# Patient Record
Sex: MALE | Race: BLACK OR AFRICAN AMERICAN | NOT HISPANIC OR LATINO | URBAN - METROPOLITAN AREA
[De-identification: names, ages, dates, MRNs, and addresses within clinical notes are randomized per-mention and may not be internally consistent; named-entity substitution may affect disease eponyms.]

---

## 2019-07-23 ENCOUNTER — INPATIENT (INPATIENT)
Facility: HOSPITAL | Age: 39
LOS: 2 days | Discharge: HOME | DRG: 603 | End: 2019-07-26
Attending: INTERNAL MEDICINE | Admitting: INTERNAL MEDICINE
Payer: COMMERCIAL

## 2019-07-23 VITALS
WEIGHT: 210.1 LBS | HEART RATE: 92 BPM | HEIGHT: 69 IN | DIASTOLIC BLOOD PRESSURE: 82 MMHG | OXYGEN SATURATION: 100 % | RESPIRATION RATE: 16 BRPM | SYSTOLIC BLOOD PRESSURE: 138 MMHG | TEMPERATURE: 99 F

## 2019-07-23 DIAGNOSIS — L03.119 CELLULITIS OF UNSPECIFIED PART OF LIMB: ICD-10-CM

## 2019-07-23 DIAGNOSIS — D64.9 ANEMIA, UNSPECIFIED: ICD-10-CM

## 2019-07-23 DIAGNOSIS — Z29.9 ENCOUNTER FOR PROPHYLACTIC MEASURES, UNSPECIFIED: ICD-10-CM

## 2019-07-23 DIAGNOSIS — L03.115 CELLULITIS OF RIGHT LOWER LIMB: ICD-10-CM

## 2019-07-23 DIAGNOSIS — I10 ESSENTIAL (PRIMARY) HYPERTENSION: ICD-10-CM

## 2019-07-23 LAB
ANION GAP SERPL CALC-SCNC: 4 MMOL/L — LOW (ref 5–17)
BUN SERPL-MCNC: 13 MG/DL — SIGNIFICANT CHANGE UP (ref 7–18)
CALCIUM SERPL-MCNC: 8.6 MG/DL — SIGNIFICANT CHANGE UP (ref 8.4–10.5)
CHLORIDE SERPL-SCNC: 106 MMOL/L — SIGNIFICANT CHANGE UP (ref 96–108)
CO2 SERPL-SCNC: 28 MMOL/L — SIGNIFICANT CHANGE UP (ref 22–31)
CREAT SERPL-MCNC: 1.26 MG/DL — SIGNIFICANT CHANGE UP (ref 0.5–1.3)
GLUCOSE SERPL-MCNC: 92 MG/DL — SIGNIFICANT CHANGE UP (ref 70–99)
HCT VFR BLD CALC: 38.2 % — LOW (ref 39–50)
HGB BLD-MCNC: 12.2 G/DL — LOW (ref 13–17)
MCHC RBC-ENTMCNC: 24.6 PG — LOW (ref 27–34)
MCHC RBC-ENTMCNC: 31.9 GM/DL — LOW (ref 32–36)
MCV RBC AUTO: 77 FL — LOW (ref 80–100)
NRBC # BLD: 0 /100 WBCS — SIGNIFICANT CHANGE UP (ref 0–0)
PLATELET # BLD AUTO: 294 K/UL — SIGNIFICANT CHANGE UP (ref 150–400)
POTASSIUM SERPL-MCNC: 4.2 MMOL/L — SIGNIFICANT CHANGE UP (ref 3.5–5.3)
POTASSIUM SERPL-SCNC: 4.2 MMOL/L — SIGNIFICANT CHANGE UP (ref 3.5–5.3)
RBC # BLD: 4.96 M/UL — SIGNIFICANT CHANGE UP (ref 4.2–5.8)
RBC # FLD: 15.7 % — HIGH (ref 10.3–14.5)
SODIUM SERPL-SCNC: 138 MMOL/L — SIGNIFICANT CHANGE UP (ref 135–145)
WBC # BLD: 7.97 K/UL — SIGNIFICANT CHANGE UP (ref 3.8–10.5)
WBC # FLD AUTO: 7.97 K/UL — SIGNIFICANT CHANGE UP (ref 3.8–10.5)

## 2019-07-23 PROCEDURE — 99223 1ST HOSP IP/OBS HIGH 75: CPT

## 2019-07-23 PROCEDURE — 73620 X-RAY EXAM OF FOOT: CPT | Mod: 26,RT

## 2019-07-23 PROCEDURE — 99285 EMERGENCY DEPT VISIT HI MDM: CPT

## 2019-07-23 RX ORDER — ENOXAPARIN SODIUM 100 MG/ML
40 INJECTION SUBCUTANEOUS DAILY
Refills: 0 | Status: DISCONTINUED | OUTPATIENT
Start: 2019-07-23 | End: 2019-07-26

## 2019-07-23 RX ORDER — NYSTATIN CREAM 100000 [USP'U]/G
1 CREAM TOPICAL EVERY 12 HOURS
Refills: 0 | Status: DISCONTINUED | OUTPATIENT
Start: 2019-07-23 | End: 2019-07-24

## 2019-07-23 RX ORDER — LISINOPRIL 2.5 MG/1
2.5 TABLET ORAL DAILY
Refills: 0 | Status: DISCONTINUED | OUTPATIENT
Start: 2019-07-23 | End: 2019-07-24

## 2019-07-23 RX ORDER — ACETAMINOPHEN 500 MG
650 TABLET ORAL EVERY 6 HOURS
Refills: 0 | Status: DISCONTINUED | OUTPATIENT
Start: 2019-07-23 | End: 2019-07-26

## 2019-07-23 RX ORDER — AMPICILLIN SODIUM AND SULBACTAM SODIUM 250; 125 MG/ML; MG/ML
3 INJECTION, POWDER, FOR SUSPENSION INTRAMUSCULAR; INTRAVENOUS ONCE
Refills: 0 | Status: COMPLETED | OUTPATIENT
Start: 2019-07-23 | End: 2019-07-23

## 2019-07-23 RX ORDER — VANCOMYCIN HCL 1 G
1000 VIAL (EA) INTRAVENOUS ONCE
Refills: 0 | Status: COMPLETED | OUTPATIENT
Start: 2019-07-23 | End: 2019-07-23

## 2019-07-23 RX ADMIN — AMPICILLIN SODIUM AND SULBACTAM SODIUM 3 GRAM(S): 250; 125 INJECTION, POWDER, FOR SUSPENSION INTRAMUSCULAR; INTRAVENOUS at 22:40

## 2019-07-23 RX ADMIN — AMPICILLIN SODIUM AND SULBACTAM SODIUM 200 GRAM(S): 250; 125 INJECTION, POWDER, FOR SUSPENSION INTRAMUSCULAR; INTRAVENOUS at 22:15

## 2019-07-23 RX ADMIN — Medication 250 MILLIGRAM(S): at 22:40

## 2019-07-23 NOTE — H&P ADULT - NSHPPHYSICALEXAM_GEN_ALL_CORE
Vital Signs Last 24 Hrs  T(C): 37.6 (23 Jul 2019 22:38), Max: 37.6 (23 Jul 2019 22:38)  T(F): 99.6 (23 Jul 2019 22:38), Max: 99.6 (23 Jul 2019 22:38)  HR: 64 (23 Jul 2019 22:38) (64 - 92)  BP: 133/73 (23 Jul 2019 22:38) (133/73 - 138/82)  BP(mean): --  RR: 18 (23 Jul 2019 22:38) (16 - 18)  SpO2: 100% (23 Jul 2019 22:38) (100% - 100%)

## 2019-07-23 NOTE — H&P ADULT - PROBLEM SELECTOR PLAN 1
p/w serous drainage from right foot and pain w/ symptoms on left foot as well  s/p 10 days of Augmentin outpatient with no improvement  s/p vanc and unasyn x 1 in ED  c/w IV antibiotics  f/u xray of right foot  f/u blood cultures x2  f/u ESR and CRP  f/u HbA1C  podiatry consulted  ID consult p/w serous drainage from right foot and pain w/ symptoms on left foot as well  s/p 10 days of Augmentin outpatient with no improvement  s/p vanc and unasyn x 1 in ED  c/w vancomycin 1500mg q12  f/u xray of right foot  f/u blood cultures x2  f/u ESR and CRP  f/u HbA1C  podiatry consulted  ID consulted - Dr. Nunez p/w serous and foul-smelling drainage from right foot and pain w/ symptoms on left foot as well  s/p 10 days of Augmentin outpatient with no improvement  s/p vanc and unasyn x 1 in ED  c/w vancomycin 1500mg q12  f/u xray of right foot  f/u blood cultures x2  f/u ESR and CRP  f/u HbA1C  podiatry consulted  ID consulted - Dr. Nunez

## 2019-07-23 NOTE — H&P ADULT - EXTREMITIES COMMENTS
marked serous drainage from right foot with foul odor; frothy layer over plantar surface of left foot noted;  no obvious ulcerations noted, but evidence of skin breakdown of right foot plantar surface noted

## 2019-07-23 NOTE — H&P ADULT - ASSESSMENT
40 y/o male admitted to medicine for b/l foot cellulitis, worse on the right, refractory to outpatient therapy.

## 2019-07-23 NOTE — H&P ADULT - ATTENDING COMMENTS
This is a 39 year old man with no known medical history presenting to the ED on account of failed outpatient antimicrobial therapy for suspected right foot soft tissue infection. He states he had tinea infection in both feet which became worse on the right with skin breakdown. He saw a podiatrist who treated him with topical antifungal cream and PO Augmentin for a week. It appeared the suspected foot infection became worse after he returned to the podiatrist and he was sent in for further management.  He denies any fevers or any constitutional symptoms.    Vital Signs Last 24 Hrs  T(C): 37.6 (23 Jul 2019 22:38), Max: 37.6 (23 Jul 2019 22:38)  T(F): 99.6 (23 Jul 2019 22:38), Max: 99.6 (23 Jul 2019 22:38)  HR: 64 (23 Jul 2019 22:38) (64 - 92)  BP: 133/73 (23 Jul 2019 22:38) (133/73 - 138/82)  RR: 18 (23 Jul 2019 22:38) (16 - 18)  SpO2: 100% (23 Jul 2019 22:38) (100% - 100%)    Elderly man, NAD AAO X 3  CTA B/L; RRR S1S2 only  Soft NT ND BS +  Right foot - extensive maceration of the forefoot and toes with swelling and peeling /denudationof the plantar skin with copious foul smelling clear / ?serous drainage. The skin beyond the toe appear excoriated. There is no warmth, tenderness or erythema.  Left foot appears less involved with some maceration in the forefoot/toes   No focal deficits                          12.2   7.97  )-----------( 294      ( 23 Jul 2019 21:54 )             38.2     07-23    138  |  106  |  13  ----------------------------<  92  4.2   |  28  |  1.26    Ca    8.6      23 Jul 2019 21:54    x ra y foot- appears unremarkable - independent review.    Impression  39 year old with no medical hx with B/L feet infection R>>>L with failed outpatient therapy.    Plan  Admit to Medicine  Empiric antibiotics with MRSA coverage This is a 39 year old man with no known medical history presenting to the ED on account of failed outpatient antimicrobial therapy for suspected right foot soft tissue infection. He states he had tinea infection in both feet which became worse on the right with skin breakdown. He saw a podiatrist who treated him with topical antifungal cream and PO Augmentin for a week. It appeared the suspected foot infection became worse after he returned to the podiatrist and he was sent in for further management.  He denies any fevers or any constitutional symptoms.    Vital Signs Last 24 Hrs  T(C): 37.6 (23 Jul 2019 22:38), Max: 37.6 (23 Jul 2019 22:38)  T(F): 99.6 (23 Jul 2019 22:38), Max: 99.6 (23 Jul 2019 22:38)  HR: 64 (23 Jul 2019 22:38) (64 - 92)  BP: 133/73 (23 Jul 2019 22:38) (133/73 - 138/82)  RR: 18 (23 Jul 2019 22:38) (16 - 18)  SpO2: 100% (23 Jul 2019 22:38) (100% - 100%)    Elderly man, NAD AAO X 3  CTA B/L; RRR S1S2 only  Soft NT ND BS +  Right foot - extensive maceration of the forefoot and toes with swelling and peeling /denudationof the plantar skin with copious foul smelling clear / ?serous drainage. The skin beyond the toe appear excoriated. There is no warmth, tenderness or erythema.  Left foot appears less involved with some maceration in the forefoot/toes   No focal deficits                          12.2   7.97  )-----------( 294      ( 23 Jul 2019 21:54 )             38.2     07-23    138  |  106  |  13  ----------------------------<  92  4.2   |  28  |  1.26    Ca    8.6      23 Jul 2019 21:54    x ra y foot- appears unremarkable - independent review.    Impression  39 year old with no medical hx with B/L feet infection R>>>L with failed outpatient therapy. No hx of DM2 or CRF but with borderline anemia level    Plan  Admit to Medicine  Empiric antibiotics with MRSA coverage  Will continue with empiric antibiotics with MRSA coverage  IV Doxycycline vs IV vancomycin   Wound care consult  F/up pending cultures  F/up anemia work up.  Counseling on proper foot care to prevent tinea in the future occurrence.

## 2019-07-23 NOTE — H&P ADULT - PROBLEM SELECTOR PLAN 2
pt denies any prior hx of HTN  bp 138/82  starting lisinopril 5mg qd  monitor vitals  DASH diet pt denies any prior hx of HTN  bp 138/82 mmHg in ED  starting lisinopril 2.5mg qd  monitor vitals  DASH diet

## 2019-07-23 NOTE — H&P ADULT - PROBLEM SELECTOR PLAN 4
IMPROVE VTE Individual Risk Assessment    RISK                                                                Points    [  ] Previous VTE                                                  3  [  ] Thrombophilia                                               2  [  ] Lower limb paralysis                                      2        (unable to hold up >15 seconds)    [  ] Current Cancer                                              2         (within 6 months)  [X  ] Immobilization > 24 hrs                                1  [  ] ICU/CCU stay > 24 hours                              1  [  ] Age > 60                                                      1    IMPROVE VTE Score ____1_____    Lovenox 40mg subq for dvt ppx

## 2019-07-23 NOTE — ED PROVIDER NOTE - OBJECTIVE STATEMENT
40 y/o M patient with no significant PMHx and no significant PSHx presents from podiatrist to the ED with an infected wound to the right foot. Patient says he has been experiencing severe Athlete's foot. Patient says he noticed a laceration to the right foot which became infected. Patient says he returned to his podiatrist who informed patient that his laceration became infected and he also has a bacterial infection to the site. Patient says he was Rx a x10 day course of Augmentin which he finished x2 day ago. Patient endorses no relief with the Augmentin and says he has foul smelling drainage and swelling to the right foot. Patient says he presented back to his podiatrist who advised him to come to the ED for IV antibiotics. Patient describes the areas as red and itchy. Patient notes changing his wound dressings x2-x3 times a day and using antifungal cream. Patient denies fever, chills, and any other complaints. NKDA.

## 2019-07-23 NOTE — H&P ADULT - NSHPSOCIALHISTORY_GEN_ALL_CORE
Pt reports smoking 2 cigars daily for 10 years. Pt reports smoking 2 cigars daily for 10 years. No alcohol or illicit drug use.

## 2019-07-23 NOTE — H&P ADULT - NEGATIVE NEUROLOGICAL SYMPTOMS
no paresthesias/no focal seizures/no syncope/no weakness/no headache/no difficulty walking/no transient paralysis

## 2019-07-23 NOTE — ED ADULT NURSE NOTE - OBJECTIVE STATEMENT
pt is for wounds.  pt stated that received a phone call from podiatry to go ER d/t infection b/l foot, c/o pain, yellow drainage from right foot, denied fever or chills, pt calm at this time.

## 2019-07-23 NOTE — H&P ADULT - HISTORY OF PRESENT ILLNESS
38 y/o male with no PMHx presents to the ED with chief complaint of right foot pain with serous discharge x 11 days. Patient reports that he was given Augmentin and an antifungal cream by his podiatrist in [place] and reports that he completed the full 10 days of treatment without improvement. His podiatrist instructed him to go to the ED if he didn't notice improvement, which is why he comes in today. He denies fever at home. He reports that the discharge from his foot is mostly "watery" but has noticed yellow discoloration as well. He also reports that he has started developing similar symptoms in his left foot. He denies any cough, sob, dysuria, nausea, vomiting or any other symptoms at this time. He reports last traveling in May when he went to [place]. He says his fungal foot infection, which he was told was athlete's foot, developed from being barefoot in the locker room at his gym.    In the ED, patient received vancomycin x1 and unasyn x1.

## 2019-07-23 NOTE — ED PROVIDER NOTE - CLINICAL SUMMARY MEDICAL DECISION MAKING FREE TEXT BOX
Patient with infected wound to the right foot. Patient failed out patient  treatment. Obtain labs, foot X-ray and admit for IV antibiotics.

## 2019-07-23 NOTE — ED ADULT NURSE NOTE - NSIMPLEMENTINTERV_GEN_ALL_ED
Implemented All Universal Safety Interventions:  [place] to call system. Call bell, personal items and telephone within reach. Instruct patient to call for assistance. Room bathroom lighting operational. Non-slip footwear when patient is off stretcher. Physically safe environment: no spills, clutter or unnecessary equipment. Stretcher in lowest position, wheels locked, appropriate side rails in place.

## 2019-07-24 LAB
24R-OH-CALCIDIOL SERPL-MCNC: 18 NG/ML — LOW (ref 30–80)
ALBUMIN SERPL ELPH-MCNC: 3.1 G/DL — LOW (ref 3.5–5)
ALP SERPL-CCNC: 72 U/L — SIGNIFICANT CHANGE UP (ref 40–120)
ALT FLD-CCNC: 16 U/L DA — SIGNIFICANT CHANGE UP (ref 10–60)
ANION GAP SERPL CALC-SCNC: 5 MMOL/L — SIGNIFICANT CHANGE UP (ref 5–17)
AST SERPL-CCNC: 12 U/L — SIGNIFICANT CHANGE UP (ref 10–40)
BASOPHILS # BLD AUTO: 0.03 K/UL — SIGNIFICANT CHANGE UP (ref 0–0.2)
BASOPHILS NFR BLD AUTO: 0.5 % — SIGNIFICANT CHANGE UP (ref 0–2)
BILIRUB SERPL-MCNC: 0.4 MG/DL — SIGNIFICANT CHANGE UP (ref 0.2–1.2)
BUN SERPL-MCNC: 11 MG/DL — SIGNIFICANT CHANGE UP (ref 7–18)
CALCIUM SERPL-MCNC: 8.2 MG/DL — LOW (ref 8.4–10.5)
CHLORIDE SERPL-SCNC: 106 MMOL/L — SIGNIFICANT CHANGE UP (ref 96–108)
CHOLEST SERPL-MCNC: 140 MG/DL — SIGNIFICANT CHANGE UP (ref 10–199)
CO2 SERPL-SCNC: 27 MMOL/L — SIGNIFICANT CHANGE UP (ref 22–31)
CREAT SERPL-MCNC: 1.25 MG/DL — SIGNIFICANT CHANGE UP (ref 0.5–1.3)
CRP SERPL-MCNC: 0.87 MG/DL — HIGH (ref 0–0.4)
EOSINOPHIL # BLD AUTO: 0.74 K/UL — HIGH (ref 0–0.5)
EOSINOPHIL NFR BLD AUTO: 12.4 % — HIGH (ref 0–6)
ERYTHROCYTE [SEDIMENTATION RATE] IN BLOOD: 19 MM/HR — HIGH (ref 0–15)
FERRITIN SERPL-MCNC: 236 NG/ML — SIGNIFICANT CHANGE UP (ref 30–400)
FOLATE SERPL-MCNC: 5.2 NG/ML — SIGNIFICANT CHANGE UP
GLUCOSE SERPL-MCNC: 95 MG/DL — SIGNIFICANT CHANGE UP (ref 70–99)
HBA1C BLD-MCNC: 5.8 % — HIGH (ref 4–5.6)
HCT VFR BLD CALC: 37.4 % — LOW (ref 39–50)
HDLC SERPL-MCNC: 48 MG/DL — SIGNIFICANT CHANGE UP
HGB BLD-MCNC: 12 G/DL — LOW (ref 13–17)
IMM GRANULOCYTES NFR BLD AUTO: 0.2 % — SIGNIFICANT CHANGE UP (ref 0–1.5)
IRON SATN MFR SERPL: 33 % — SIGNIFICANT CHANGE UP (ref 20–55)
IRON SATN MFR SERPL: 60 UG/DL — LOW (ref 65–170)
LIPID PNL WITH DIRECT LDL SERPL: 71 MG/DL — SIGNIFICANT CHANGE UP
LYMPHOCYTES # BLD AUTO: 1.75 K/UL — SIGNIFICANT CHANGE UP (ref 1–3.3)
LYMPHOCYTES # BLD AUTO: 29.2 % — SIGNIFICANT CHANGE UP (ref 13–44)
MAGNESIUM SERPL-MCNC: 2 MG/DL — SIGNIFICANT CHANGE UP (ref 1.6–2.6)
MCHC RBC-ENTMCNC: 24.4 PG — LOW (ref 27–34)
MCHC RBC-ENTMCNC: 32.1 GM/DL — SIGNIFICANT CHANGE UP (ref 32–36)
MCV RBC AUTO: 76 FL — LOW (ref 80–100)
MONOCYTES # BLD AUTO: 0.54 K/UL — SIGNIFICANT CHANGE UP (ref 0–0.9)
MONOCYTES NFR BLD AUTO: 9 % — SIGNIFICANT CHANGE UP (ref 2–14)
NEUTROPHILS # BLD AUTO: 2.92 K/UL — SIGNIFICANT CHANGE UP (ref 1.8–7.4)
NEUTROPHILS NFR BLD AUTO: 48.7 % — SIGNIFICANT CHANGE UP (ref 43–77)
NRBC # BLD: 0 /100 WBCS — SIGNIFICANT CHANGE UP (ref 0–0)
PHOSPHATE SERPL-MCNC: 2.9 MG/DL — SIGNIFICANT CHANGE UP (ref 2.5–4.5)
PLATELET # BLD AUTO: 290 K/UL — SIGNIFICANT CHANGE UP (ref 150–400)
POTASSIUM SERPL-MCNC: 3.5 MMOL/L — SIGNIFICANT CHANGE UP (ref 3.5–5.3)
POTASSIUM SERPL-SCNC: 3.5 MMOL/L — SIGNIFICANT CHANGE UP (ref 3.5–5.3)
PROT SERPL-MCNC: 6.7 G/DL — SIGNIFICANT CHANGE UP (ref 6–8.3)
RBC # BLD: 4.92 M/UL — SIGNIFICANT CHANGE UP (ref 4.2–5.8)
RBC # FLD: 15.6 % — HIGH (ref 10.3–14.5)
SODIUM SERPL-SCNC: 138 MMOL/L — SIGNIFICANT CHANGE UP (ref 135–145)
TIBC SERPL-MCNC: 184 UG/DL — LOW (ref 250–450)
TOTAL CHOLESTEROL/HDL RATIO MEASUREMENT: 2.9 RATIO — LOW (ref 3.4–9.6)
TRANSFERRIN SERPL-MCNC: 149 MG/DL — LOW (ref 200–360)
TRIGL SERPL-MCNC: 107 MG/DL — SIGNIFICANT CHANGE UP (ref 10–149)
TSH SERPL-MCNC: 1.22 UU/ML — SIGNIFICANT CHANGE UP (ref 0.34–4.82)
UIBC SERPL-MCNC: 124 UG/DL — SIGNIFICANT CHANGE UP (ref 110–370)
VIT B12 SERPL-MCNC: 514 PG/ML — SIGNIFICANT CHANGE UP (ref 232–1245)
WBC # BLD: 5.99 K/UL — SIGNIFICANT CHANGE UP (ref 3.8–10.5)
WBC # FLD AUTO: 5.99 K/UL — SIGNIFICANT CHANGE UP (ref 3.8–10.5)

## 2019-07-24 PROCEDURE — 99233 SBSQ HOSP IP/OBS HIGH 50: CPT | Mod: GC

## 2019-07-24 RX ORDER — POVIDONE-IODINE 5 %
1 AEROSOL (ML) TOPICAL DAILY
Refills: 0 | Status: DISCONTINUED | OUTPATIENT
Start: 2019-07-24 | End: 2019-07-26

## 2019-07-24 RX ORDER — VANCOMYCIN HCL 1 G
1500 VIAL (EA) INTRAVENOUS EVERY 12 HOURS
Refills: 0 | Status: DISCONTINUED | OUTPATIENT
Start: 2019-07-24 | End: 2019-07-26

## 2019-07-24 RX ORDER — ERGOCALCIFEROL 1.25 MG/1
50000 CAPSULE ORAL
Refills: 0 | Status: DISCONTINUED | OUTPATIENT
Start: 2019-07-24 | End: 2019-07-26

## 2019-07-24 RX ORDER — NYSTATIN CREAM 100000 [USP'U]/G
1 CREAM TOPICAL THREE TIMES A DAY
Refills: 0 | Status: DISCONTINUED | OUTPATIENT
Start: 2019-07-24 | End: 2019-07-26

## 2019-07-24 RX ORDER — SODIUM HYPOCHLORITE 0.125 %
1 SOLUTION, NON-ORAL MISCELLANEOUS DAILY
Refills: 0 | Status: DISCONTINUED | OUTPATIENT
Start: 2019-07-24 | End: 2019-07-26

## 2019-07-24 RX ADMIN — LISINOPRIL 2.5 MILLIGRAM(S): 2.5 TABLET ORAL at 06:54

## 2019-07-24 RX ADMIN — Medication 300 MILLIGRAM(S): at 06:54

## 2019-07-24 RX ADMIN — Medication 300 MILLIGRAM(S): at 18:30

## 2019-07-24 RX ADMIN — NYSTATIN CREAM 1 APPLICATION(S): 100000 CREAM TOPICAL at 15:54

## 2019-07-24 RX ADMIN — NYSTATIN CREAM 1 APPLICATION(S): 100000 CREAM TOPICAL at 21:07

## 2019-07-24 RX ADMIN — ERGOCALCIFEROL 50000 UNIT(S): 1.25 CAPSULE ORAL at 18:30

## 2019-07-24 RX ADMIN — Medication 1 APPLICATION(S): at 15:54

## 2019-07-24 RX ADMIN — NYSTATIN CREAM 1 APPLICATION(S): 100000 CREAM TOPICAL at 06:54

## 2019-07-24 NOTE — CONSULT NOTE ADULT - RS GEN PE MLT RESP DETAILS PC
clear to auscultation bilaterally/respirations non-labored no rales/clear to auscultation bilaterally/good air movement/no rhonchi/no wheezes/respirations non-labored

## 2019-07-24 NOTE — CONSULT NOTE ADULT - ASSESSMENT
Bilateral tinea pedis w/ R foot cellulitis Bilateral tinea pedis w/ R foot cellulitis     Plan: Bilateral tinea pedis w/ R foot and leg cellulitis     Plan: - cont Vancomycin 1500mgs iv q12 hrs for now  mycostatin  powder tid

## 2019-07-24 NOTE — PROGRESS NOTE ADULT - ATTENDING COMMENTS
Patient seen and examined; Agree with PGY1 A/P above with editing as needed. Discussed with PGY1 Dr. Light    patient admitted with both feet redness and weeping Right >> Left despite using Fungal cream and Oral Augmentin for 10 days. No PMH; No Hx DM; Works as ; Does wear shoes for prolonged time which at times could be wet.     Vital Signs Last 24 Hrs  T(C): 36.7 (24 Jul 2019 14:50), Max: 37.6 (23 Jul 2019 22:38)  T(F): 98.1 (24 Jul 2019 14:50), Max: 99.6 (23 Jul 2019 22:38)  HR: 65 (24 Jul 2019 14:50) (59 - 92)  BP: 116/75 (24 Jul 2019 14:50) (116/75 - 138/82)  RR: 18 (24 Jul 2019 14:50) (16 - 19)  SpO2: 99% (24 Jul 2019 14:50) (99% - 100%)    P/E:  Neuro: AAO x3; No focal deficits  CVS: S1S2 present, Regular  Resp: BLAE+, No wheeze or Rhonchi  GI: Soft, BS+, NT  Extr: B/L foot brownish discoloration, with seeping especially and malodorous    Labs:                        12.0   5.99  )-----------( 290      ( 24 Jul 2019 05:55 )             37.4   07-24    138  |  106  |  11  ----------------------------<  95  3.5   |  27  |  1.25    Ca    8.2<L>      24 Jul 2019 05:55  Phos  2.9     07-24  Mg     2.0     07-24    TPro  6.7  /  Alb  3.1<L>  /  TBili  0.4  /  DBili  x   /  AST  12  /  ALT  16  /  AlkPhos  72  07-24    D/D:  B/L Cellulitis likely superinfection of underlying Fungal infection  Doubt HTN    Plan:  Agree with Vanco; Adjust as per trough  Will consider systemic antifungal Rx  Discussed with ID Dr. Nunez; will hold off Systemic antifungal due to side effect profile of antifungals and  Rx with Nystatin powder thrice daily  D/C Lisinopril; if BP persistently elevated more than 140/80 mm Hg will consider adding it back  Ambulate as tolerated.    Discussed with Patient and significant other at bedside.   Discussed with RN and PGY1 Dr. Light and PGY2 Dr. Julian

## 2019-07-24 NOTE — CONSULT NOTE ADULT - EXTREMITIES COMMENTS
R foot: tinea pedis (interdigital area and plantar surface) w/ serous discharge and surrounding erythema, maceration  L foot hyperkeratosis with interdigital tinea pedis

## 2019-07-24 NOTE — CONSULT NOTE ADULT - SUBJECTIVE AND OBJECTIVE BOX
Patient is a 39y old  Male who presents with a chief complaint of right foot cellulitis x 11 days (24 Jul 2019 12:25)      HPI:  38 y/o male with no PMHx presents to the ED with chief complaint of right foot pain with serous discharge x 11 days. Patient reports that he was given Augmentin and an antifungal cream by his podiatrist in [place] and reports that he completed the full 10 days of treatment without improvement. His podiatrist instructed him to go to the ED if he didn't notice improvement, which is why he comes in today. He denies fever at home. He reports that the discharge from his foot is mostly "watery" but has noticed yellow discoloration as well. He also reports that he has started developing similar symptoms in his left foot. He denies any cough, sob, dysuria, nausea, vomiting or any other symptoms at this time. He reports last traveling in May when he went to [place]. He says his fungal foot infection, which he was told was athlete's foot, developed from being barefoot in the locker room at his gym.    In the ED, patient received vancomycin x1 and unasyn x1. (23 Jul 2019 23:08)    PMH:No pertinent past medical history    Allergies: No Known Allergies    Medications: acetaminophen   Tablet .. 650 milliGRAM(s) Oral every 6 hours PRN  Dakins Solution - Full Strength 1 Application(s) Topical daily  enoxaparin Injectable 40 milliGRAM(s) SubCutaneous daily  ergocalciferol 92715 Unit(s) Oral <User Schedule>  lisinopril 2.5 milliGRAM(s) Oral daily  nystatin Powder 1 Application(s) Topical three times a day  povidone iodine 10% Solution 1 Application(s) Topical daily  vancomycin  IVPB 1500 milliGRAM(s) IV Intermittent every 12 hours    FH:  PSX: No significant past surgical history    SH: acetaminophen   Tablet .. 650 milliGRAM(s) Oral every 6 hours PRN  Dakins Solution - Full Strength 1 Application(s) Topical daily  enoxaparin Injectable 40 milliGRAM(s) SubCutaneous daily  ergocalciferol 35741 Unit(s) Oral <User Schedule>  lisinopril 2.5 milliGRAM(s) Oral daily  nystatin Powder 1 Application(s) Topical three times a day  povidone iodine 10% Solution 1 Application(s) Topical daily  vancomycin  IVPB 1500 milliGRAM(s) IV Intermittent every 12 hours      Vital Signs Last 24 Hrs  T(C): 36.7 (24 Jul 2019 14:50), Max: 37.6 (23 Jul 2019 22:38)  T(F): 98.1 (24 Jul 2019 14:50), Max: 99.6 (23 Jul 2019 22:38)  HR: 65 (24 Jul 2019 14:50) (59 - 92)  BP: 116/75 (24 Jul 2019 14:50) (116/75 - 138/82)  BP(mean): --  RR: 18 (24 Jul 2019 14:50) (16 - 19)  SpO2: 99% (24 Jul 2019 14:50) (99% - 100%)    LABS                        12.0   5.99  )-----------( 290      ( 24 Jul 2019 05:55 )             37.4               07-24    138  |  106  |  11  ----------------------------<  95  3.5   |  27  |  1.25    Ca    8.2<L>      24 Jul 2019 05:55  Phos  2.9     07-24  Mg     2.0     07-24    TPro  6.7  /  Alb  3.1<L>  /  TBili  0.4  /  DBili  x   /  AST  12  /  ALT  16  /  AlkPhos  72  07-24    PHYSICAL EXAM  GEN: GALI HARRELL is a pleasant well-nourished, well developed 39y Male in no acute distress, alert awake, and oriented to person, place and time.   LE Focused:    Vasc:  DP and PT palpable, unable to assess CFT to right foot due to infection.   Derm: Maceration of right forefoot with green pigmentation of the right 4th and 5th toenail and plantar skin. Right foot swelling and erythema.  Malodor.    Neuro: Protective sensation intact b/l  MSK: Coral red and green on Woods lamp of right foot.     Imaging:   < from: Xray Foot AP + Lateral, Right (07.23.19 @ 21:57) >    EXAM:  FOOT 2VIEWS RT                            PROCEDURE DATE:  07/23/2019      INTERPRETATION:  Right foot    HISTORY: Foot infection for three weeks      Three views of the right foot show no evidence of fracture nor   destructive change. The joint spaces are maintained.    IMPRESSION: No acute bony pathology.    Note - This does not exclude fractures in perfect alignment and   apposition as presence may be indicated at one to three weeks following   callus formation.    Thank you for this referral.    GRZEGORZ BECKER M.D., ATTENDING RADIOLOGIST  This document has been electronically signed. Jul 24 2019 10:17AM     < end of copied text >    Cultures:     A: Cellulitis of right forefoot with underlying fungal and bacterial infections    P:  Patient evaluated, chart reviewed  Xrays reviewed  Podiatry will follow while in house  Dressed with Betadine, DSD  Recommend erythromycin gel into webspaces once macerations subside  Recommend continue antibiotics per primary team or ID  Culture- pending  Seen with Dr. Rivera

## 2019-07-24 NOTE — PROGRESS NOTE ADULT - SUBJECTIVE AND OBJECTIVE BOX
PGY 1 Note discussed with supervising resident and primary attending    Patient is a 39y old  Male who presents with a chief complaint of right foot cellulitis x 11 days (23 Jul 2019 23:08)      INTERVAL HPI/OVERNIGHT EVENTS: No overnight events, patient examined lying in bed. Has no acute complaints overnight. Patient denied any pain in lower extremities, denied any fever, chills, SOB, chest pain, dizziness.    MEDICATIONS  (STANDING):  enoxaparin Injectable 40 milliGRAM(s) SubCutaneous daily  lisinopril 2.5 milliGRAM(s) Oral daily  vancomycin  IVPB 1500 milliGRAM(s) IV Intermittent every 12 hours    MEDICATIONS  (PRN):  acetaminophen   Tablet .. 650 milliGRAM(s) Oral every 6 hours PRN Temp greater or equal to 38C (100.4F), Mild Pain (1 - 3)      __________________________________________________  REVIEW OF SYSTEMS:    CONSTITUTIONAL: No fever,   EYES: no acute visual disturbances  NECK: No pain or stiffness  RESPIRATORY: No cough; No shortness of breath  CARDIOVASCULAR: No chest pain, no palpitations  GASTROINTESTINAL: No pain. No nausea or vomiting; No diarrhea   NEUROLOGICAL: No headache or numbness, no tremors  MUSCULOSKELETAL: No pain in right foot, no muscle pain  ALL OTHER  ROS negative        Vital Signs Last 24 Hrs  T(C): 36.6 (24 Jul 2019 05:24), Max: 37.6 (23 Jul 2019 22:38)  T(F): 97.9 (24 Jul 2019 05:24), Max: 99.6 (23 Jul 2019 22:38)  HR: 77 (24 Jul 2019 05:24) (59 - 92)  BP: 136/77 (24 Jul 2019 05:24) (120/63 - 138/82)  BP(mean): --  RR: 18 (24 Jul 2019 05:24) (16 - 19)  SpO2: 99% (24 Jul 2019 05:24) (99% - 100%)    ________________________________________________  PHYSICAL EXAM:  GENERAL: NAD  HEENT: Normocephalic;  conjunctivae and sclerae clear; moist mucous membranes;   NECK : supple  CHEST/LUNG: Clear to auscultation bilaterally with good air entry   HEART: S1 S2  regular; no murmurs, gallops or rubs  ABDOMEN: Soft, Nontender, Nondistended; Bowel sounds present  EXTREMITIES: no cyanosis; no edema; no calf tenderness  SKIN: warm and dry; no rash  NERVOUS SYSTEM:  Awake and alert; Oriented  to place, person and time ; no new deficits    _________________________________________________  LABS:                        12.0   5.99  )-----------( 290      ( 24 Jul 2019 05:55 )             37.4     07-24    138  |  106  |  11  ----------------------------<  95  3.5   |  27  |  1.25    Ca    8.2<L>      24 Jul 2019 05:55  Phos  2.9     07-24  Mg     2.0     07-24    TPro  6.7  /  Alb  3.1<L>  /  TBili  0.4  /  DBili  x   /  AST  12  /  ALT  16  /  AlkPhos  72  07-24        CAPILLARY BLOOD GLUCOSE            RADIOLOGY & ADDITIONAL TESTS:    Imaging Personally Reviewed:  YES/NO    Consultant(s) Notes Reviewed:   YES/ No    Care Discussed with Consultants :     Plan of care was discussed with patient and /or primary care giver; all questions and concerns were addressed and care was aligned with patient's wishes.

## 2019-07-24 NOTE — CONSULT NOTE ADULT - GASTROINTESTINAL DETAILS
no distention/nontender/bowel sounds normal/soft no organomegaly/no guarding/soft/no distention/bowel sounds normal/no masses palpable/no rigidity/nontender

## 2019-07-24 NOTE — CONSULT NOTE ADULT - SUBJECTIVE AND OBJECTIVE BOX
HPI:  40 y/o male with no PMHx presents to the ED with chief complaint of right foot pain with serous discharge x 11 days. Patient reports that he was given Augmentin and an antifungal cream by his podiatrist in [place] and reports that he completed the full 10 days of treatment without improvement. His podiatrist instructed him to go to the ED if he didn't notice improvement, which is why he comes in today. He denies fever at home. He reports that the discharge from his foot is mostly "watery" but has noticed yellow discoloration as well. He also reports that he has started developing similar symptoms in his left foot. He denies any cough, sob, dysuria, nausea, vomiting or any other symptoms at this time. He reports last traveling in May when he went to [place]. He says his fungal foot infection, which he was told was athlete's foot, developed from being barefoot in the locker room at his gym.    In the ED, patient received vancomycin x1 and unasyn x1. (23 Jul 2019 23:08)      PAST MEDICAL & SURGICAL HISTORY:  No pertinent past medical history  No significant past surgical history      No Known Allergies      Meds:  acetaminophen   Tablet .. 650 milliGRAM(s) Oral every 6 hours PRN  enoxaparin Injectable 40 milliGRAM(s) SubCutaneous daily  lisinopril 2.5 milliGRAM(s) Oral daily  vancomycin  IVPB 1500 milliGRAM(s) IV Intermittent every 12 hours      SOCIAL HISTORY:  Smoker:  YES / NO        PACK YEARS:                         WHEN QUIT?  ETOH use:  YES / NO               FREQUENCY / QUANTITY:  Ilicit Drug use:  YES / NO  Occupation:  Assisted device use (Cane / Walker):  Live with:    FAMILY HISTORY:      VITALS:  Vital Signs Last 24 Hrs  T(C): 36.6 (24 Jul 2019 05:24), Max: 37.6 (23 Jul 2019 22:38)  T(F): 97.9 (24 Jul 2019 05:24), Max: 99.6 (23 Jul 2019 22:38)  HR: 77 (24 Jul 2019 05:24) (59 - 92)  BP: 136/77 (24 Jul 2019 05:24) (120/63 - 138/82)  BP(mean): --  RR: 18 (24 Jul 2019 05:24) (16 - 19)  SpO2: 99% (24 Jul 2019 05:24) (99% - 100%)    LABS/DIAGNOSTIC TESTS:                          12.0   5.99  )-----------( 290      ( 24 Jul 2019 05:55 )             37.4     WBC Count: 5.99 K/uL (07-24 @ 05:55)  WBC Count: 7.97 K/uL (07-23 @ 21:54)      07-24    138  |  106  |  11  ----------------------------<  95  3.5   |  27  |  1.25    Ca    8.2<L>      24 Jul 2019 05:55  Phos  2.9     07-24  Mg     2.0     07-24    TPro  6.7  /  Alb  3.1<L>  /  TBili  0.4  /  DBili  x   /  AST  12  /  ALT  16  /  AlkPhos  72  07-24          LIVER FUNCTIONS - ( 24 Jul 2019 05:55 )  Alb: 3.1 g/dL / Pro: 6.7 g/dL / ALK PHOS: 72 U/L / ALT: 16 U/L DA / AST: 12 U/L / GGT: x                 LACTATE:    ABG -     CULTURES:       RADIOLOGY:        ROS  [  ] UNABLE TO ELICIT HPI:  39M, from home, w/ PMHx Athlete's foot, sent by podiatrist R foot cellulitis.  Patient reports having athlete's foot for 2 months, but recently scratched aggressively, sloughing off some skin which later got infected. Patient mentions completing Augmentin treatment for 10 days in addition to antifungal topical cream w/ no improvement of symptoms. Patient mentions worsening interdigital itchiness, redness and malodorous discharge. Otherwise, denies any other symptoms (no fever/chills/pain/ diarrhea).     Patient seen and examined at bedside, reports feeling well. XR foot negative for destructive changes.       PAST MEDICAL & SURGICAL HISTORY:  No pertinent past medical history  No significant past surgical history      No Known Allergies      Meds:  acetaminophen   Tablet .. 650 milliGRAM(s) Oral every 6 hours PRN  enoxaparin Injectable 40 milliGRAM(s) SubCutaneous daily  lisinopril 2.5 milliGRAM(s) Oral daily  vancomycin  IVPB 1500 milliGRAM(s) IV Intermittent every 12 hours      SOCIAL HISTORY:  Smoker:  no  ETOH use: no  Ilicit Drug use: no  Live with: spouse    FAMILY HISTORY: non contributory      VITALS:  Vital Signs Last 24 Hrs  T(C): 36.6 (24 Jul 2019 05:24), Max: 37.6 (23 Jul 2019 22:38)  T(F): 97.9 (24 Jul 2019 05:24), Max: 99.6 (23 Jul 2019 22:38)  HR: 77 (24 Jul 2019 05:24) (59 - 92)  BP: 136/77 (24 Jul 2019 05:24) (120/63 - 138/82)  BP(mean): --  RR: 18 (24 Jul 2019 05:24) (16 - 19)  SpO2: 99% (24 Jul 2019 05:24) (99% - 100%)    LABS/DIAGNOSTIC TESTS:                          12.0   5.99  )-----------( 290      ( 24 Jul 2019 05:55 )             37.4     WBC Count: 5.99 K/uL (07-24 @ 05:55)  WBC Count: 7.97 K/uL (07-23 @ 21:54)      07-24    138  |  106  |  11  ----------------------------<  95  3.5   |  27  |  1.25    Ca    8.2<L>      24 Jul 2019 05:55  Phos  2.9     07-24  Mg     2.0     07-24    TPro  6.7  /  Alb  3.1<L>  /  TBili  0.4  /  DBili  x   /  AST  12  /  ALT  16  /  AlkPhos  72  07-24          LIVER FUNCTIONS - ( 24 Jul 2019 05:55 )  Alb: 3.1 g/dL / Pro: 6.7 g/dL / ALK PHOS: 72 U/L / ALT: 16 U/L DA / AST: 12 U/L / GGT: x                 CULTURES: testing      RADIOLOGY:      < from: Xray Foot AP + Lateral, Right (07.23.19 @ 21:57) >    EXAM:  FOOT 2VIEWS RT                            PROCEDURE DATE:  07/23/2019          INTERPRETATION:  Right foot    HISTORY: Foot infection for three weeks      Three views of the right foot show no evidence of fracture nor   destructive change. The joint spaces are maintained.    IMPRESSION: No acute bony pathology.    Note - This does not exclude fractures in perfect alignment and   apposition as presence may be indicated at one to three weeks following   callus formation.    Thank you for this referral.                GRZEGORZ BECKER M.D., ATTENDING RADIOLOGIST  This document has been electronically signed. Jul 24 2019 10:17AM                < end of copied text > HPI:  39M, from home, w/ PMHx Athlete's foot, sent by podiatrist R foot cellulitis.  Patient reports having athlete's foot for 2 months, but recently scratched aggressively, sloughing off some skin which later got infected. Patient mentions completing Augmentin treatment for 10 days in addition to antifungal topical cream w/ no improvement of symptoms. Patient mentions worsening interdigital itchiness, redness and malodorous discharge. Otherwise, denies any other symptoms (no fever/chills/pain/ diarrhea).     Patient seen and examined at bedside, reports feeling well. XR foot negative for destructive changes.       PAST MEDICAL & SURGICAL HISTORY:  No pertinent past medical history  No significant past surgical history      No Known Allergies      Meds:  acetaminophen   Tablet .. 650 milliGRAM(s) Oral every 6 hours PRN  enoxaparin Injectable 40 milliGRAM(s) SubCutaneous daily  lisinopril 2.5 milliGRAM(s) Oral daily  vancomycin  IVPB 1500 milliGRAM(s) IV Intermittent every 12 hours      SOCIAL HISTORY:  Smoker:  yes  ETOH use: no  Illicit Drug use: no  Live with: spouse    FAMILY HISTORY: non contributory      VITALS:  Vital Signs Last 24 Hrs  T(C): 36.6 (24 Jul 2019 05:24), Max: 37.6 (23 Jul 2019 22:38)  T(F): 97.9 (24 Jul 2019 05:24), Max: 99.6 (23 Jul 2019 22:38)  HR: 77 (24 Jul 2019 05:24) (59 - 92)  BP: 136/77 (24 Jul 2019 05:24) (120/63 - 138/82)  BP(mean): --  RR: 18 (24 Jul 2019 05:24) (16 - 19)  SpO2: 99% (24 Jul 2019 05:24) (99% - 100%)    LABS/DIAGNOSTIC TESTS:                          12.0   5.99  )-----------( 290      ( 24 Jul 2019 05:55 )             37.4     WBC Count: 5.99 K/uL (07-24 @ 05:55)  WBC Count: 7.97 K/uL (07-23 @ 21:54)      07-24    138  |  106  |  11  ----------------------------<  95  3.5   |  27  |  1.25    Ca    8.2<L>      24 Jul 2019 05:55  Phos  2.9     07-24  Mg     2.0     07-24    TPro  6.7  /  Alb  3.1<L>  /  TBili  0.4  /  DBili  x   /  AST  12  /  ALT  16  /  AlkPhos  72  07-24          LIVER FUNCTIONS - ( 24 Jul 2019 05:55 )  Alb: 3.1 g/dL / Pro: 6.7 g/dL / ALK PHOS: 72 U/L / ALT: 16 U/L DA / AST: 12 U/L / GGT: x                 CULTURES: testing      RADIOLOGY:      < from: Xray Foot AP + Lateral, Right (07.23.19 @ 21:57) >    EXAM:  FOOT 2VIEWS RT                            PROCEDURE DATE:  07/23/2019          INTERPRETATION:  Right foot    HISTORY: Foot infection for three weeks      Three views of the right foot show no evidence of fracture nor   destructive change. The joint spaces are maintained.    IMPRESSION: No acute bony pathology.    Note - This does not exclude fractures in perfect alignment and   apposition as presence may be indicated at one to three weeks following   callus formation.    Thank you for this referral.                GRZEGORZ BECKER M.D., ATTENDING RADIOLOGIST  This document has been electronically signed. Jul 24 2019 10:17AM                < end of copied text > HPI:  39M, from home, w/ PMHx Athlete's foot, sent by podiatrist R foot cellulitis.  Patient reports having athlete's foot for 2 months, but recently scratched aggressively, sloughing off some skin which later got infected. Patient mentions completing Augmentin treatment for 10 days in addition to antifungal topical cream w/ no improvement of symptoms. Patient mentions worsening interdigital itchiness, redness and malodorous discharge. Otherwise, denies any other symptoms (no fever/chills/pain/ diarrhea).     Patient seen and examined at bedside, reports feeling better . XR foot negative for destructive changes. Asked to see pt for cellulitis of his right foot and leg.      PAST MEDICAL & SURGICAL HISTORY:  No pertinent past medical history  No significant past surgical history      No Known Allergies      Meds:  acetaminophen   Tablet .. 650 milliGRAM(s) Oral every 6 hours PRN  enoxaparin Injectable 40 milliGRAM(s) SubCutaneous daily  lisinopril 2.5 milliGRAM(s) Oral daily  vancomycin  IVPB 1500 milliGRAM(s) IV Intermittent every 12 hours      SOCIAL HISTORY:  Smoker:  yes  ETOH use: no  Illicit Drug use: no  Live with: spouse    FAMILY HISTORY: non contributory      VITALS:  Vital Signs Last 24 Hrs  T(C): 36.6 (24 Jul 2019 05:24), Max: 37.6 (23 Jul 2019 22:38)  T(F): 97.9 (24 Jul 2019 05:24), Max: 99.6 (23 Jul 2019 22:38)  HR: 77 (24 Jul 2019 05:24) (59 - 92)  BP: 136/77 (24 Jul 2019 05:24) (120/63 - 138/82)  BP(mean): --  RR: 18 (24 Jul 2019 05:24) (16 - 19)  SpO2: 99% (24 Jul 2019 05:24) (99% - 100%)    LABS/DIAGNOSTIC TESTS:                          12.0   5.99  )-----------( 290      ( 24 Jul 2019 05:55 )             37.4     WBC Count: 5.99 K/uL (07-24 @ 05:55)  WBC Count: 7.97 K/uL (07-23 @ 21:54)      07-24    138  |  106  |  11  ----------------------------<  95  3.5   |  27  |  1.25    Ca    8.2<L>      24 Jul 2019 05:55  Phos  2.9     07-24  Mg     2.0     07-24    TPro  6.7  /  Alb  3.1<L>  /  TBili  0.4  /  DBili  x   /  AST  12  /  ALT  16  /  AlkPhos  72  07-24          LIVER FUNCTIONS - ( 24 Jul 2019 05:55 )  Alb: 3.1 g/dL / Pro: 6.7 g/dL / ALK PHOS: 72 U/L / ALT: 16 U/L DA / AST: 12 U/L / GGT: x                 CULTURES: testing      RADIOLOGY:      < from: Xray Foot AP + Lateral, Right (07.23.19 @ 21:57) >    EXAM:  FOOT 2VIEWS RT                            PROCEDURE DATE:  07/23/2019          INTERPRETATION:  Right foot    HISTORY: Foot infection for three weeks      Three views of the right foot show no evidence of fracture nor   destructive change. The joint spaces are maintained.    IMPRESSION: No acute bony pathology.    Note - This does not exclude fractures in perfect alignment and   apposition as presence may be indicated at one to three weeks following   callus formation.    Thank you for this referral.                GRZEGORZ BECKER M.D., ATTENDING RADIOLOGIST  This document has been electronically signed. Jul 24 2019 10:17AM                < end of copied text >

## 2019-07-25 ENCOUNTER — TRANSCRIPTION ENCOUNTER (OUTPATIENT)
Age: 39
End: 2019-07-25

## 2019-07-25 DIAGNOSIS — E55.9 VITAMIN D DEFICIENCY, UNSPECIFIED: ICD-10-CM

## 2019-07-25 LAB
BASOPHILS # BLD AUTO: 0.04 K/UL — SIGNIFICANT CHANGE UP (ref 0–0.2)
BASOPHILS NFR BLD AUTO: 0.6 % — SIGNIFICANT CHANGE UP (ref 0–2)
EOSINOPHIL # BLD AUTO: 0.81 K/UL — HIGH (ref 0–0.5)
EOSINOPHIL NFR BLD AUTO: 12.6 % — HIGH (ref 0–6)
HCT VFR BLD CALC: 39 % — SIGNIFICANT CHANGE UP (ref 39–50)
HGB BLD-MCNC: 12.5 G/DL — LOW (ref 13–17)
IMM GRANULOCYTES NFR BLD AUTO: 0.3 % — SIGNIFICANT CHANGE UP (ref 0–1.5)
LYMPHOCYTES # BLD AUTO: 1.88 K/UL — SIGNIFICANT CHANGE UP (ref 1–3.3)
LYMPHOCYTES # BLD AUTO: 29.3 % — SIGNIFICANT CHANGE UP (ref 13–44)
MCHC RBC-ENTMCNC: 24.4 PG — LOW (ref 27–34)
MCHC RBC-ENTMCNC: 32.1 GM/DL — SIGNIFICANT CHANGE UP (ref 32–36)
MCV RBC AUTO: 76.2 FL — LOW (ref 80–100)
MONOCYTES # BLD AUTO: 0.47 K/UL — SIGNIFICANT CHANGE UP (ref 0–0.9)
MONOCYTES NFR BLD AUTO: 7.3 % — SIGNIFICANT CHANGE UP (ref 2–14)
NEUTROPHILS # BLD AUTO: 3.2 K/UL — SIGNIFICANT CHANGE UP (ref 1.8–7.4)
NEUTROPHILS NFR BLD AUTO: 49.9 % — SIGNIFICANT CHANGE UP (ref 43–77)
NRBC # BLD: 0 /100 WBCS — SIGNIFICANT CHANGE UP (ref 0–0)
PLATELET # BLD AUTO: 320 K/UL — SIGNIFICANT CHANGE UP (ref 150–400)
RBC # BLD: 5.12 M/UL — SIGNIFICANT CHANGE UP (ref 4.2–5.8)
RBC # FLD: 15.5 % — HIGH (ref 10.3–14.5)
VANCOMYCIN TROUGH SERPL-MCNC: 11.9 UG/ML — SIGNIFICANT CHANGE UP (ref 10–20)
WBC # BLD: 6.42 K/UL — SIGNIFICANT CHANGE UP (ref 3.8–10.5)
WBC # FLD AUTO: 6.42 K/UL — SIGNIFICANT CHANGE UP (ref 3.8–10.5)

## 2019-07-25 PROCEDURE — 99232 SBSQ HOSP IP/OBS MODERATE 35: CPT | Mod: GC

## 2019-07-25 RX ORDER — SENNA PLUS 8.6 MG/1
1 TABLET ORAL DAILY
Refills: 0 | Status: DISCONTINUED | OUTPATIENT
Start: 2019-07-25 | End: 2019-07-26

## 2019-07-25 RX ORDER — FERROUS SULFATE 325(65) MG
325 TABLET ORAL DAILY
Refills: 0 | Status: DISCONTINUED | OUTPATIENT
Start: 2019-07-25 | End: 2019-07-26

## 2019-07-25 RX ADMIN — NYSTATIN CREAM 1 APPLICATION(S): 100000 CREAM TOPICAL at 06:56

## 2019-07-25 RX ADMIN — Medication 325 MILLIGRAM(S): at 12:53

## 2019-07-25 RX ADMIN — Medication 1 APPLICATION(S): at 12:53

## 2019-07-25 RX ADMIN — NYSTATIN CREAM 1 APPLICATION(S): 100000 CREAM TOPICAL at 15:00

## 2019-07-25 RX ADMIN — SENNA PLUS 1 TABLET(S): 8.6 TABLET ORAL at 12:53

## 2019-07-25 RX ADMIN — Medication 300 MILLIGRAM(S): at 19:01

## 2019-07-25 RX ADMIN — ENOXAPARIN SODIUM 40 MILLIGRAM(S): 100 INJECTION SUBCUTANEOUS at 12:59

## 2019-07-25 RX ADMIN — Medication 300 MILLIGRAM(S): at 07:46

## 2019-07-25 NOTE — PROGRESS NOTE ADULT - PROBLEM SELECTOR PLAN 1
p/w serous and foul-smelling drainage from right foot and pain w/ symptoms on left foot as well  s/p 10 days of Augmentin outpatient with no improvement  s/p vanc and unasyn x 1 in ED  On vancomycin 1500mg q12  xray- no fractures or destructive change  f/u blood cultures x2  ESR, CRP elevated  HbA1C- 5.8  podiatry consulted  -Betadine dressing, DS, erythromycin gel into web spaces when maceration resolves.  ID consulted - Dr. Nunez  -Nystatin powder instead of systemic antifungal due to side effect profile  -Continue vanc current dose as vanc trough therapeutic, likely bacterial superimposed on fungal

## 2019-07-25 NOTE — PROGRESS NOTE ADULT - SUBJECTIVE AND OBJECTIVE BOX
PGY 1 Note discussed with supervising resident and primary attending    Patient is a 39y old  Male who presents with a chief complaint of right foot cellulitis x 11 days (24 Jul 2019 18:46)      INTERVAL HPI/OVERNIGHT EVENTS: No acute overnight events, no complaints    MEDICATIONS  (STANDING):  Dakins Solution - Full Strength 1 Application(s) Topical daily  enoxaparin Injectable 40 milliGRAM(s) SubCutaneous daily  ergocalciferol 64823 Unit(s) Oral <User Schedule>  ferrous    sulfate 325 milliGRAM(s) Oral daily  nystatin Powder 1 Application(s) Topical three times a day  povidone iodine 10% Solution 1 Application(s) Topical daily  senna 1 Tablet(s) Oral daily  vancomycin  IVPB 1500 milliGRAM(s) IV Intermittent every 12 hours    MEDICATIONS  (PRN):  acetaminophen   Tablet .. 650 milliGRAM(s) Oral every 6 hours PRN Temp greater or equal to 38C (100.4F), Mild Pain (1 - 3)      __________________________________________________  REVIEW OF SYSTEMS:    CONSTITUTIONAL: No fever,   RESPIRATORY: No cough; No shortness of breath  CARDIOVASCULAR: No chest pain, no palpitations  GASTROINTESTINAL: No pain. No nausea or vomiting; No diarrhea   NEUROLOGICAL: No headache or numbness, no tremors  MUSCULOSKELETAL: No joint pain, no muscle pain  GENITOURINARY: no dysuria, no frequency, no hesitancy  ALL OTHER  ROS negative        Vital Signs Last 24 Hrs  T(C): 36.7 (25 Jul 2019 05:15), Max: 36.7 (24 Jul 2019 14:50)  T(F): 98.1 (25 Jul 2019 05:15), Max: 98.1 (24 Jul 2019 14:50)  HR: 66 (25 Jul 2019 05:15) (57 - 66)  BP: 137/83 (25 Jul 2019 05:15) (114/53 - 137/83)  BP(mean): --  RR: 18 (25 Jul 2019 05:15) (18 - 18)  SpO2: 100% (25 Jul 2019 05:15) (99% - 100%)    ________________________________________________  PHYSICAL EXAM:  GENERAL: NAD, patient observed lying in bed, well nourished.  HEENT: Normocephalic;  conjunctivae and sclerae clear; moist mucous membranes;   NECK : supple  CHEST/LUNG: Clear to auscultation bilaterally with good air entry   HEART: S1 S2  regular; no murmurs, gallops or rubs  ABDOMEN: Soft, Nontender, Nondistended; Bowel sounds present  EXTREMITIES: dressing observed on right foot.  SKIN: warm and dry; no rash  NERVOUS SYSTEM:  Awake and alert; Oriented  to place, person and time ; no new deficits    _________________________________________________  LABS:                        12.5   6.42  )-----------( 320      ( 25 Jul 2019 07:14 )             39.0     07-24    138  |  106  |  11  ----------------------------<  95  3.5   |  27  |  1.25    Ca    8.2<L>      24 Jul 2019 05:55  Phos  2.9     07-24  Mg     2.0     07-24    TPro  6.7  /  Alb  3.1<L>  /  TBili  0.4  /  DBili  x   /  AST  12  /  ALT  16  /  AlkPhos  72  07-24        CAPILLARY BLOOD GLUCOSE            RADIOLOGY & ADDITIONAL TESTS:    Imaging Personally Reviewed:  YES    Consultant(s) Notes Reviewed:   YES  Care Discussed with Consultants :     Plan of care was discussed with patient and /or primary care giver; all questions and concerns were addressed and care was aligned with patient's wishes.

## 2019-07-25 NOTE — PROGRESS NOTE ADULT - PROBLEM SELECTOR PLAN 3
Hgb 12.2 on admission  f/u anemia panel  -ferritin wnl 236  -transferrin decreased 149  -TIBC low 184

## 2019-07-25 NOTE — PROGRESS NOTE ADULT - PROBLEM SELECTOR PLAN 2
pt denies any prior hx of HTN  bp well controlled  discontinued lisinopril 2.5mg qd   May restart if BP rises  Consider different agent as patient is African American  Sold
pt denies any prior hx of HTN  bp 138/82 mmHg in ED  starting lisinopril 2.5mg qd  May discontinue if BP decreases  Consider different agent as patient is African American  [employer]

## 2019-07-25 NOTE — PROGRESS NOTE ADULT - SUBJECTIVE AND OBJECTIVE BOX
Patient is a 39y old  Male who presents with a chief complaint of right foot cellulitis x 11 days (24 Jul 2019 12:25)      HPI:  38 y/o male with no PMHx presents to the ED with chief complaint of right foot pain with serous discharge x 11 days. Patient reports that he was given Augmentin and an antifungal cream by his podiatrist in [place] and reports that he completed the full 10 days of treatment without improvement. His podiatrist instructed him to go to the ED if he didn't notice improvement, which is why he comes in today. He denies fever at home. He reports that the discharge from his foot is mostly "watery" but has noticed yellow discoloration as well. He also reports that he has started developing similar symptoms in his left foot. He denies any cough, sob, dysuria, nausea, vomiting or any other symptoms at this time. He reports last traveling in May when he went to [place]. He says his fungal foot infection, which he was told was athlete's foot, developed from being barefoot in the locker room at his gym.    In the ED, patient received vancomycin x1 and unasyn x1. (23 Jul 2019 23:08)    PMH:No pertinent past medical history    Allergies: No Known Allergies    Medications: acetaminophen   Tablet .. 650 milliGRAM(s) Oral every 6 hours PRN  Dakins Solution - Full Strength 1 Application(s) Topical daily  enoxaparin Injectable 40 milliGRAM(s) SubCutaneous daily  ergocalciferol 22455 Unit(s) Oral <User Schedule>  lisinopril 2.5 milliGRAM(s) Oral daily  nystatin Powder 1 Application(s) Topical three times a day  povidone iodine 10% Solution 1 Application(s) Topical daily  vancomycin  IVPB 1500 milliGRAM(s) IV Intermittent every 12 hours    FH:  PSX: No significant past surgical history    SH: acetaminophen   Tablet .. 650 milliGRAM(s) Oral every 6 hours PRN  Dakins Solution - Full Strength 1 Application(s) Topical daily  enoxaparin Injectable 40 milliGRAM(s) SubCutaneous daily  ergocalciferol 16157 Unit(s) Oral <User Schedule>  lisinopril 2.5 milliGRAM(s) Oral daily  nystatin Powder 1 Application(s) Topical three times a day  povidone iodine 10% Solution 1 Application(s) Topical daily  vancomycin  IVPB 1500 milliGRAM(s) IV Intermittent every 12 hours      Vital Signs Last 24 Hrs  T(C): 36.7 (25 Jul 2019 21:20), Max: 36.7 (25 Jul 2019 05:15)  T(F): 98.1 (25 Jul 2019 21:20), Max: 98.1 (25 Jul 2019 05:15)  HR: 58 (25 Jul 2019 21:20) (58 - 66)  BP: 121/67 (25 Jul 2019 21:20) (120/76 - 137/83)  BP(mean): --  RR: 18 (25 Jul 2019 21:20) (18 - 18)  SpO2: 100% (25 Jul 2019 21:20) (98% - 100%)  LABS                                    12.5   6.42  )-----------( 320      ( 25 Jul 2019 07:14 )             39.0       07-24    138  |  106  |  11  ----------------------------<  95  3.5   |  27  |  1.25    Ca    8.2<L>      24 Jul 2019 05:55  Phos  2.9     07-24  Mg     2.0     07-24    TPro  6.7  /  Alb  3.1<L>  /  TBili  0.4  /  DBili  x   /  AST  12  /  ALT  16  /  AlkPhos  72  07-24      PHYSICAL EXAM  GEN: GALI HARRELL is a pleasant well-nourished, well developed 39y Male in no acute distress, alert awake, and oriented to person, place and time.   LE Focused:    Vasc:  DP and PT palpable, unable to assess CFT to right foot due to infection.   Derm: Maceration of right forefoot with green pigmentation of the right 4th and 5th toenail and plantar skin. Right foot swelling and erythema.  Malodor.    Neuro: Protective sensation intact b/l  MSK: Coral red and green on Woods lamp of right foot.     Imaging:   < from: Xray Foot AP + Lateral, Right (07.23.19 @ 21:57) >    EXAM:  FOOT 2VIEWS RT                            PROCEDURE DATE:  07/23/2019      INTERPRETATION:  Right foot    HISTORY: Foot infection for three weeks      Three views of the right foot show no evidence of fracture nor   destructive change. The joint spaces are maintained.    IMPRESSION: No acute bony pathology.    Note - This does not exclude fractures in perfect alignment and   apposition as presence may be indicated at one to three weeks following   callus formation.    Thank you for this referral.    GRZEGORZ BECKER M.D., ATTENDING RADIOLOGIST  This document has been electronically signed. Jul 24 2019 10:17AM     < end of copied text >    Cultures:     A: Cellulitis of right forefoot with underlying fungal and bacterial infections    P:  Patient evaluated, chart reviewed  Xrays reviewed  Podiatry will follow while in house  Dressed with Betadine,adaptic DSD for right foot, nystatin powder and DSD for left foot  Recommend erythromycin gel into webspaces once macerations subside  Recommend continue antibiotics per primary team or ID  Culture- pending  Seen with Dr. Rivera

## 2019-07-25 NOTE — PROGRESS NOTE ADULT - ATTENDING COMMENTS
Patient seen and examined earlier this afternoon; Agree with PGY1 A/P above with editing as needed. Discussed with PGY1 Dr. Light.    Patient is doing better. Both feet infection seems drying up; less seeping. No acute distress. No diarrhea.    Vital Signs Last 24 Hrs  T(C): 36.7 (25 Jul 2019 14:34), Max: 36.7 (25 Jul 2019 05:15)  T(F): 98.1 (25 Jul 2019 14:34), Max: 98.1 (25 Jul 2019 05:15)  HR: 66 (25 Jul 2019 14:34) (57 - 66)  BP: 120/76 (25 Jul 2019 14:34) (114/53 - 137/83)  RR: 18 (25 Jul 2019 14:34) (18 - 18)  SpO2: 98% (25 Jul 2019 14:34) (98% - 100%)    P/E:  Neuro: AAO x3; No focal deficits  CVS: S1S2 present, Regular  Resp: BLAE+, No wheeze or Rhonchi  GI: Soft, BS+, NT  Extr: B/L foot less discoloration and seeping noted    Labs:                        12.5   6.42  )-----------( 320      ( 25 Jul 2019 07:14 )             39.0     D/D:  B/L Cellulitis likely superinfection of underlying Fungal infection  No evidence of HTN      Plan:  Continue Vanco; Trough is slightly low but okay for cellulitis coverage   Discussed with ID if continues to improve clinically will D/C Plan tomorrow evening into Saturday likely on oral Doxy.   Ambulate as tolerated.  Patient provided letter for work stating he is hospitalized    Discussed with Patient plan of care  Discussed with RN and PGY1 Dr. Light and PGY2 Dr. Julian Patient seen and examined earlier this afternoon; Agree with PGY1 A/P above with editing as needed. Discussed with PGY1 Dr. Dumont.    Patient is doing better. Both feet infection seems drying up; less seeping. No acute distress. No diarrhea.    Vital Signs Last 24 Hrs  T(C): 36.7 (25 Jul 2019 14:34), Max: 36.7 (25 Jul 2019 05:15)  T(F): 98.1 (25 Jul 2019 14:34), Max: 98.1 (25 Jul 2019 05:15)  HR: 66 (25 Jul 2019 14:34) (57 - 66)  BP: 120/76 (25 Jul 2019 14:34) (114/53 - 137/83)  RR: 18 (25 Jul 2019 14:34) (18 - 18)  SpO2: 98% (25 Jul 2019 14:34) (98% - 100%)    P/E:  Neuro: AAO x3; No focal deficits  CVS: S1S2 present, Regular  Resp: BLAE+, No wheeze or Rhonchi  GI: Soft, BS+, NT  Extr: B/L foot less discoloration and seeping noted    Labs:                        12.5   6.42  )-----------( 320      ( 25 Jul 2019 07:14 )             39.0     D/D:  B/L Cellulitis likely superinfection of underlying Fungal infection  No evidence of HTN      Plan:  Continue Vanco; Trough is slightly low but okay for cellulitis coverage; he is on 1.5 gm q 12 hrs;  ID agrees   Discussed with ID if continues to improve clinically will D/C Plan tomorrow evening into Saturday likely on oral Doxy.   Ambulate as tolerated.  Patient provided letter for work stating he is hospitalized    Discussed with Patient plan of care  Discussed with RN and PGY1 Dr. Dumont and PGY2 Dr. Julian Patient seen and examined;  Agree with PGY1 A/P above with editing as needed. Discussed with PGY1 Dr. Dumont.    Patient is doing well. Both feet infection seems drying u well.;     Vital Signs Last 24 Hrs: reviewed; stable    P/E:  Neuro: AAO x3; No focal deficits  CVS: S1S2 present, Regular  Resp: BLAE+, No wheeze or Rhonchi  GI: Soft, BS+, NT  Extr: B/L foot less discoloration and driied up    Labs:  07-26    140  |  107  |  9   ----------------------------<  93  4.2   |  30  |  1.34<H>    Ca    9.0      26 Jul 2019 06:19           D/D:  B/L Cellulitis likely superinfection of underlying severe Tinea Pedis infection  No evidence of HTN      Plan:  ID follow up appreciated  Discussed with Dr. Nunez; D/C Home on oral Doxyfor one weekand Nystatin powder for one month  Patient was advised to keep foot dry and keep clean as well as avoid wetting of feet at work with long boots  ID also provided note for work    Discussed with Patient plan of care  Discussed with RN Josefina and PGY1 Dr. Dumont and PGY2 Dr. Julian    D/C Home; See discharge note

## 2019-07-25 NOTE — PROGRESS NOTE ADULT - SUBJECTIVE AND OBJECTIVE BOX
39y Male    Meds:  vancomycin  IVPB 1500 milliGRAM(s) IV Intermittent every 12 hours    Allergies    No Known Allergies    Intolerances        VITALS:  Vital Signs Last 24 Hrs  T(C): 36.7 (25 Jul 2019 05:15), Max: 36.7 (24 Jul 2019 14:50)  T(F): 98.1 (25 Jul 2019 05:15), Max: 98.1 (24 Jul 2019 14:50)  HR: 66 (25 Jul 2019 05:15) (57 - 66)  BP: 137/83 (25 Jul 2019 05:15) (114/53 - 137/83)  BP(mean): --  RR: 18 (25 Jul 2019 05:15) (18 - 18)  SpO2: 100% (25 Jul 2019 05:15) (99% - 100%)    LABS/DIAGNOSTIC TESTS:                          12.5   6.42  )-----------( 320      ( 25 Jul 2019 07:14 )             39.0         07-24    138  |  106  |  11  ----------------------------<  95  3.5   |  27  |  1.25    Ca    8.2<L>      24 Jul 2019 05:55  Phos  2.9     07-24  Mg     2.0     07-24    TPro  6.7  /  Alb  3.1<L>  /  TBili  0.4  /  DBili  x   /  AST  12  /  ALT  16  /  AlkPhos  72  07-24      LIVER FUNCTIONS - ( 24 Jul 2019 05:55 )  Alb: 3.1 g/dL / Pro: 6.7 g/dL / ALK PHOS: 72 U/L / ALT: 16 U/L DA / AST: 12 U/L / GGT: x             CULTURES: .Blood  07-24 @ 10:05   No growth to date.  --  --            RADIOLOGY:      ROS:  [  ] UNABLE TO ELICIT 39y Male whose right foot and leg are doing better, he has less swelling and redness of his foot and lower leg. He still has a lot of discharge from both feet because of his Tinea Pedis infection. He has no fevers or chills, no diarrhea or other complaints. His vanco trough is a little low but he is on a high dose of vanco already.    Meds:  vancomycin  IVPB 1500 milliGRAM(s) IV Intermittent every 12 hours    Allergies    No Known Allergies    Intolerances        VITALS:  Vital Signs Last 24 Hrs  T(C): 36.7 (25 Jul 2019 05:15), Max: 36.7 (24 Jul 2019 14:50)  T(F): 98.1 (25 Jul 2019 05:15), Max: 98.1 (24 Jul 2019 14:50)  HR: 66 (25 Jul 2019 05:15) (57 - 66)  BP: 137/83 (25 Jul 2019 05:15) (114/53 - 137/83)  BP(mean): --  RR: 18 (25 Jul 2019 05:15) (18 - 18)  SpO2: 100% (25 Jul 2019 05:15) (99% - 100%)    LABS/DIAGNOSTIC TESTS:                          12.5   6.42  )-----------( 320      ( 25 Jul 2019 07:14 )             39.0     Vancomycin Level, Trough (07.25.19 @ 05:53)    Vancomycin Level, Trough: 11.9:       07-24    138  |  106  |  11  ----------------------------<  95  3.5   |  27  |  1.25    Ca    8.2<L>      24 Jul 2019 05:55  Phos  2.9     07-24  Mg     2.0     07-24    TPro  6.7  /  Alb  3.1<L>  /  TBili  0.4  /  DBili  x   /  AST  12  /  ALT  16  /  AlkPhos  72  07-24      LIVER FUNCTIONS - ( 24 Jul 2019 05:55 )  Alb: 3.1 g/dL / Pro: 6.7 g/dL / ALK PHOS: 72 U/L / ALT: 16 U/L DA / AST: 12 U/L / GGT: x             CULTURES: .Blood  07-24 @ 10:05   No growth to date.  --  --            RADIOLOGY:      ROS:  [  ] UNABLE TO ELICIT

## 2019-07-26 ENCOUNTER — TRANSCRIPTION ENCOUNTER (OUTPATIENT)
Age: 39
End: 2019-07-26

## 2019-07-26 VITALS
HEART RATE: 66 BPM | TEMPERATURE: 98 F | SYSTOLIC BLOOD PRESSURE: 146 MMHG | OXYGEN SATURATION: 100 % | RESPIRATION RATE: 18 BRPM | DIASTOLIC BLOOD PRESSURE: 75 MMHG

## 2019-07-26 LAB
-  AMIKACIN: SIGNIFICANT CHANGE UP
-  AMPICILLIN/SULBACTAM: SIGNIFICANT CHANGE UP
-  AMPICILLIN/SULBACTAM: SIGNIFICANT CHANGE UP
-  AZTREONAM: SIGNIFICANT CHANGE UP
-  CEFAZOLIN: SIGNIFICANT CHANGE UP
-  CEFAZOLIN: SIGNIFICANT CHANGE UP
-  CEFEPIME: SIGNIFICANT CHANGE UP
-  CEFTAZIDIME: SIGNIFICANT CHANGE UP
-  CIPROFLOXACIN: SIGNIFICANT CHANGE UP
-  CLINDAMYCIN: SIGNIFICANT CHANGE UP
-  CLINDAMYCIN: SIGNIFICANT CHANGE UP
-  DAPTOMYCIN: SIGNIFICANT CHANGE UP
-  ERYTHROMYCIN: SIGNIFICANT CHANGE UP
-  ERYTHROMYCIN: SIGNIFICANT CHANGE UP
-  GENTAMICIN: SIGNIFICANT CHANGE UP
-  IMIPENEM: SIGNIFICANT CHANGE UP
-  LEVOFLOXACIN: SIGNIFICANT CHANGE UP
-  LINEZOLID: SIGNIFICANT CHANGE UP
-  MEROPENEM: SIGNIFICANT CHANGE UP
-  OXACILLIN: SIGNIFICANT CHANGE UP
-  OXACILLIN: SIGNIFICANT CHANGE UP
-  PENICILLIN: SIGNIFICANT CHANGE UP
-  PENICILLIN: SIGNIFICANT CHANGE UP
-  PIPERACILLIN/TAZOBACTAM: SIGNIFICANT CHANGE UP
-  RIFAMPIN: SIGNIFICANT CHANGE UP
-  RIFAMPIN: SIGNIFICANT CHANGE UP
-  TETRACYCLINE: SIGNIFICANT CHANGE UP
-  TETRACYCLINE: SIGNIFICANT CHANGE UP
-  TOBRAMYCIN: SIGNIFICANT CHANGE UP
-  TRIMETHOPRIM/SULFAMETHOXAZOLE: SIGNIFICANT CHANGE UP
-  TRIMETHOPRIM/SULFAMETHOXAZOLE: SIGNIFICANT CHANGE UP
-  VANCOMYCIN: SIGNIFICANT CHANGE UP
-  VANCOMYCIN: SIGNIFICANT CHANGE UP
ANION GAP SERPL CALC-SCNC: 3 MMOL/L — LOW (ref 5–17)
BUN SERPL-MCNC: 9 MG/DL — SIGNIFICANT CHANGE UP (ref 7–18)
CALCIUM SERPL-MCNC: 9 MG/DL — SIGNIFICANT CHANGE UP (ref 8.4–10.5)
CHLORIDE SERPL-SCNC: 107 MMOL/L — SIGNIFICANT CHANGE UP (ref 96–108)
CO2 SERPL-SCNC: 30 MMOL/L — SIGNIFICANT CHANGE UP (ref 22–31)
CREAT SERPL-MCNC: 1.34 MG/DL — HIGH (ref 0.5–1.3)
GLUCOSE SERPL-MCNC: 93 MG/DL — SIGNIFICANT CHANGE UP (ref 70–99)
METHOD TYPE: SIGNIFICANT CHANGE UP
POTASSIUM SERPL-MCNC: 4.2 MMOL/L — SIGNIFICANT CHANGE UP (ref 3.5–5.3)
POTASSIUM SERPL-SCNC: 4.2 MMOL/L — SIGNIFICANT CHANGE UP (ref 3.5–5.3)
SODIUM SERPL-SCNC: 140 MMOL/L — SIGNIFICANT CHANGE UP (ref 135–145)

## 2019-07-26 PROCEDURE — 80053 COMPREHEN METABOLIC PANEL: CPT

## 2019-07-26 PROCEDURE — 83735 ASSAY OF MAGNESIUM: CPT

## 2019-07-26 PROCEDURE — 84100 ASSAY OF PHOSPHORUS: CPT

## 2019-07-26 PROCEDURE — 82607 VITAMIN B-12: CPT

## 2019-07-26 PROCEDURE — 87186 SC STD MICRODIL/AGAR DIL: CPT

## 2019-07-26 PROCEDURE — 36415 COLL VENOUS BLD VENIPUNCTURE: CPT

## 2019-07-26 PROCEDURE — 83036 HEMOGLOBIN GLYCOSYLATED A1C: CPT

## 2019-07-26 PROCEDURE — 85652 RBC SED RATE AUTOMATED: CPT

## 2019-07-26 PROCEDURE — 83540 ASSAY OF IRON: CPT

## 2019-07-26 PROCEDURE — 82728 ASSAY OF FERRITIN: CPT

## 2019-07-26 PROCEDURE — 99239 HOSP IP/OBS DSCHRG MGMT >30: CPT | Mod: GC

## 2019-07-26 PROCEDURE — 96375 TX/PRO/DX INJ NEW DRUG ADDON: CPT

## 2019-07-26 PROCEDURE — 80048 BASIC METABOLIC PNL TOTAL CA: CPT

## 2019-07-26 PROCEDURE — 85027 COMPLETE CBC AUTOMATED: CPT

## 2019-07-26 PROCEDURE — 73620 X-RAY EXAM OF FOOT: CPT

## 2019-07-26 PROCEDURE — 82746 ASSAY OF FOLIC ACID SERUM: CPT

## 2019-07-26 PROCEDURE — 99285 EMERGENCY DEPT VISIT HI MDM: CPT | Mod: 25

## 2019-07-26 PROCEDURE — 96365 THER/PROPH/DIAG IV INF INIT: CPT

## 2019-07-26 PROCEDURE — 86140 C-REACTIVE PROTEIN: CPT

## 2019-07-26 PROCEDURE — 83550 IRON BINDING TEST: CPT

## 2019-07-26 PROCEDURE — 80202 ASSAY OF VANCOMYCIN: CPT

## 2019-07-26 PROCEDURE — 84443 ASSAY THYROID STIM HORMONE: CPT

## 2019-07-26 PROCEDURE — 80061 LIPID PANEL: CPT

## 2019-07-26 PROCEDURE — 87040 BLOOD CULTURE FOR BACTERIA: CPT

## 2019-07-26 PROCEDURE — 84466 ASSAY OF TRANSFERRIN: CPT

## 2019-07-26 PROCEDURE — 82306 VITAMIN D 25 HYDROXY: CPT

## 2019-07-26 PROCEDURE — 87070 CULTURE OTHR SPECIMN AEROBIC: CPT

## 2019-07-26 RX ORDER — ERGOCALCIFEROL 1.25 MG/1
1 CAPSULE ORAL
Qty: 7 | Refills: 0
Start: 2019-07-26 | End: 2019-09-11

## 2019-07-26 RX ORDER — NYSTATIN CREAM 100000 [USP'U]/G
1 CREAM TOPICAL
Qty: 10 | Refills: 0
Start: 2019-07-26 | End: 2019-08-24

## 2019-07-26 RX ADMIN — Medication 1 APPLICATION(S): at 13:12

## 2019-07-26 RX ADMIN — SENNA PLUS 1 TABLET(S): 8.6 TABLET ORAL at 13:08

## 2019-07-26 RX ADMIN — Medication 1 APPLICATION(S): at 13:13

## 2019-07-26 RX ADMIN — Medication 325 MILLIGRAM(S): at 13:08

## 2019-07-26 RX ADMIN — Medication 300 MILLIGRAM(S): at 06:34

## 2019-07-26 NOTE — DISCHARGE NOTE PROVIDER - HOSPITAL COURSE
38 y/o male with no PMHx presents to the ED with chief complaint of right foot pain with serous discharge x 11 days. Patient reports that he was given Augmentin and an antifungal cream by his podiatrist in [place] and reports that he completed the full 10 days of treatment without improvement. His podiatrist instructed him to go to the ED if he didn't notice improvement, which is why he comes in today. He denies fever at home. He reports that the discharge from his foot is mostly "watery" but has noticed yellow discoloration as well. He also reports that he has started developing similar symptoms in his left foot. He denies any cough, sob, dysuria, nausea, vomiting or any other symptoms at this time. He reports last traveling in May when he went to [place]. He says his fungal foot infection, which he was told was athlete's foot, developed from being barefoot in the locker room at his gym.    In the ED patient received a dose of Unasyn and Vancomycin. Patient admitted for further management of cellulitis. Patient was started on 1.5g IV Vancomycin q12h. Podiatry was consulted and recommended Dressed with Betadine, DSD Recommend erythromycin gel into webspaces once macerations subside. ID- Dr. Nunez was consulted and recommended nystatin powder as well as continuing vancomycin. Patient to be discharged on ######################    Patient is stable for discharge per attending and is advised to follow up with PCP as outpatient    Please refer to patient's complete medical chart with documents for a full hospital course, for this is only a brief summary. 40 y/o male with no PMHx presents to the ED with chief complaint of right foot pain with serous discharge x 11 days. Patient reports that he was given Augmentin and an antifungal cream by his podiatrist in [place] and reports that he completed the full 10 days of treatment without improvement. His podiatrist instructed him to go to the ED if he didn't notice improvement, which is why he comes in today. He denies fever at home. He reports that the discharge from his foot is mostly "watery" but has noticed yellow discoloration as well. He also reports that he has started developing similar symptoms in his left foot. He denies any cough, sob, dysuria, nausea, vomiting or any other symptoms at this time. He reports last traveling in May when he went to [place]. He says his fungal foot infection, which he was told was athlete's foot, developed from being barefoot in the locker room at his gym.    In the ED patient received a dose of Unasyn and Vancomycin. Patient admitted for further management of cellulitis. Patient was started on 1.5g IV Vancomycin q12h. Podiatry was consulted and recommended Dressed with Betadine, DSD Recommend erythromycin gel into webspaces once macerations subside. ID- Dr. Nunez was consulted and recommended nystatin powder as well as continuing vancomycin. Patient to be discharged on doxycycline 100 BID for 1 week as well as Nystatin powder.    Patient is stable for discharge per attending and is advised to follow up with PCP as outpatient    Please refer to patient's complete medical chart with documents for a full hospital course, for this is only a brief summary.

## 2019-07-26 NOTE — PROGRESS NOTE ADULT - SUBJECTIVE AND OBJECTIVE BOX
39y Male who is doing better clinically as far as his cellulitis goes, he still has significant drainage from his feet because of the fungus, he feels the betadine is not helping. He has no fevers or chills or diarrhea.    Meds:  vancomycin  IVPB 1500 milliGRAM(s) IV Intermittent every 12 hours    Allergies    No Known Allergies    Intolerances        VITALS:  Vital Signs Last 24 Hrs  T(C): 36.7 (26 Jul 2019 05:38), Max: 36.7 (25 Jul 2019 14:34)  T(F): 98 (26 Jul 2019 05:38), Max: 98.1 (25 Jul 2019 14:34)  HR: 67 (26 Jul 2019 05:38) (58 - 67)  BP: 128/82 (26 Jul 2019 05:38) (120/76 - 128/82)  BP(mean): --  RR: 18 (26 Jul 2019 05:38) (18 - 18)  SpO2: 100% (26 Jul 2019 05:38) (98% - 100%)    LABS/DIAGNOSTIC TESTS:                          12.5   6.42  )-----------( 320      ( 25 Jul 2019 07:14 )             39.0         07-26    140  |  107  |  9   ----------------------------<  93  4.2   |  30  |  1.34<H>    Ca    9.0      26 Jul 2019 06:19            CULTURES: .Surgical Swab  07-25 @ 01:09   Numerous Gram Negative Rods  Moderate Staphylococcus aureus  Moderate Alpha hemolytic strep "Susceptibilities not performed"  --  --      .Blood  07-24 @ 10:05   No growth to date.  --  --            RADIOLOGY:      ROS:  [  ] UNABLE TO ELICIT

## 2019-07-26 NOTE — PROGRESS NOTE ADULT - REASON FOR ADMISSION
right foot cellulitis x 11 days
right foot cellulitis, failed outpatient augmentin therapy
right foot cellulitis x 11 days

## 2019-07-26 NOTE — DISCHARGE NOTE PROVIDER - NSDCCPCAREPLAN_GEN_ALL_CORE_FT
PRINCIPAL DISCHARGE DIAGNOSIS  Diagnosis: Cellulitis of right lower extremity  Assessment and Plan of Treatment: You presented with cellulitis of your right lower extremity. It is likely bacterial superimposed on fungal. Please continue your antibiotic treatment as well as nystatin powder. Keep your foot dry. Please follow up with your primary care physician in one week to inform them of your recent hospitalization.      SECONDARY DISCHARGE DIAGNOSES  Diagnosis: Anemia  Assessment and Plan of Treatment: You were noted to have low hemoglobin levels. This may be a result of your current infection. Please continue taking the medicines as prescribed. Please follow up with your primary care physician in one week to inform them of your recent hospitalization and low hemoglobin levels.    Diagnosis: Vitamin D deficiency  Assessment and Plan of Treatment: You was noted to have low Vitamin D levels less than 30. You was started on once a week 50,000 units Vitamin D. Please continue to take for about 8 weeks after which your PCP should repeat levels. Once levels more than 30, you doctor may switch to 1000 units daily, maintenance dose

## 2019-07-26 NOTE — PROGRESS NOTE ADULT - SUBJECTIVE AND OBJECTIVE BOX
Patient is a 39y old  Male who presents with a chief complaint of right foot cellulitis x 11 days (24 Jul 2019 12:25)      HPI:  38 y/o male with no PMHx presents to the ED with chief complaint of right foot pain with serous discharge x 11 days. Patient reports that he was given Augmentin and an antifungal cream by his podiatrist in [place] and reports that he completed the full 10 days of treatment without improvement. His podiatrist instructed him to go to the ED if he didn't notice improvement, which is why he comes in today. He denies fever at home. He reports that the discharge from his foot is mostly "watery" but has noticed yellow discoloration as well. He also reports that he has started developing similar symptoms in his left foot. He denies any cough, sob, dysuria, nausea, vomiting or any other symptoms at this time. He reports last traveling in May when he went to [place]. He says his fungal foot infection, which he was told was athlete's foot, developed from being barefoot in the locker room at his gym.    In the ED, patient received vancomycin x1 and unasyn x1. (23 Jul 2019 23:08)    Patient is seen bedside this AM.  Patient states that he has not felt any real difference.  Denies n/f/v/d.    PMH:No pertinent past medical history    Allergies: No Known Allergies    Medications: acetaminophen   Tablet .. 650 milliGRAM(s) Oral every 6 hours PRN  Dakins Solution - Full Strength 1 Application(s) Topical daily  enoxaparin Injectable 40 milliGRAM(s) SubCutaneous daily  ergocalciferol 03524 Unit(s) Oral <User Schedule>  lisinopril 2.5 milliGRAM(s) Oral daily  nystatin Powder 1 Application(s) Topical three times a day  povidone iodine 10% Solution 1 Application(s) Topical daily  vancomycin  IVPB 1500 milliGRAM(s) IV Intermittent every 12 hours    FH:  PSX: No significant past surgical history    SH: acetaminophen   Tablet .. 650 milliGRAM(s) Oral every 6 hours PRN  Dakins Solution - Full Strength 1 Application(s) Topical daily  enoxaparin Injectable 40 milliGRAM(s) SubCutaneous daily  ergocalciferol 75140 Unit(s) Oral <User Schedule>  lisinopril 2.5 milliGRAM(s) Oral daily  nystatin Powder 1 Application(s) Topical three times a day  povidone iodine 10% Solution 1 Application(s) Topical daily  vancomycin  IVPB 1500 milliGRAM(s) IV Intermittent every 12 hours      Vital Signs Last 24 Hrs  T(C): 36.7 (26 Jul 2019 05:38), Max: 36.7 (25 Jul 2019 14:34)  T(F): 98 (26 Jul 2019 05:38), Max: 98.1 (25 Jul 2019 14:34)  HR: 67 (26 Jul 2019 05:38) (58 - 67)  BP: 128/82 (26 Jul 2019 05:38) (120/76 - 128/82)  BP(mean): --  RR: 18 (26 Jul 2019 05:38) (18 - 18)  SpO2: 100% (26 Jul 2019 05:38) (98% - 100%)                          12.5   6.42  )-----------( 320      ( 25 Jul 2019 07:14 )             39.0   07-26    140  |  107  |  9   ----------------------------<  93  4.2   |  30  |  1.34<H>    Ca    9.0      26 Jul 2019 06:19    WBC Count: 6.42 K/uL (07-25 @ 07:14)  WBC Count: 5.99 K/uL (07-24 @ 05:55)  WBC Count: 7.97 K/uL (07-23 @ 21:54)    PHYSICAL EXAM  GEN: GALI HARRELL is a pleasant well-nourished, well developed 39y Male in no acute distress, alert awake, and oriented to person, place and time.   LE Focused:    Vasc:  DP and PT palpable, unable to assess CFT to right foot due to infection.   Derm: Maceration of right forefoot with green pigmentation of the right 4th and 5th toenail and plantar skin. Right foot swelling and erythema.  Malodor.    Neuro: Protective sensation intact b/l  MSK: Coral red and green on Woods lamp of right foot.     Imaging:   < from: Xray Foot AP + Lateral, Right (07.23.19 @ 21:57) >    EXAM:  FOOT 2VIEWS RT                            PROCEDURE DATE:  07/23/2019      INTERPRETATION:  Right foot    HISTORY: Foot infection for three weeks      Three views of the right foot show no evidence of fracture nor   destructive change. The joint spaces are maintained.    IMPRESSION: No acute bony pathology.    Note - This does not exclude fractures in perfect alignment and   apposition as presence may be indicated at one to three weeks following   callus formation.    Thank you for this referral.    GRZEGORZ BECKER M.D., ATTENDING RADIOLOGIST  This document has been electronically signed. Jul 24 2019 10:17AM     < end of copied text >    Cultures: Culture - Surgical Swab (07.25.19 @ 01:09)    Specimen Source: .Surgical Swab    Culture Results:   Numerous Staphylococcus aureus    Culture - Surgical Swab (07.25.19 @ 01:09)    Specimen Source: .Surgical Swab    Culture Results:   Numerous Gram Negative Rods  Moderate Staphylococcus aureus  Moderate Alpha hemolytic strep "Susceptibilities not performed"      A: Cellulitis of right forefoot with underlying fungal and bacterial infections    P:  Patient evaluated, chart reviewed  Xrays reviewed  Dressed with Betadine, DSD for both feet  Recommend oral antifungal  Culture- reviewed as above  Podiatry will follow while in house  Seen with Dr. Rivera

## 2019-07-26 NOTE — PROGRESS NOTE ADULT - GASTROINTESTINAL DETAILS
soft/no rigidity/no organomegaly/nontender/no guarding/bowel sounds normal/no distention
no guarding/soft/no distention/bowel sounds normal/nontender/no rigidity

## 2019-07-26 NOTE — PROGRESS NOTE ADULT - ASSESSMENT
40 y/o male no medical history admitted to medicine for b/l foot cellulitis, worse on the right without any fever, chills, SOB. Failed outpatient augmentin therapy for 10 days. Admitted for further management of cellulitis. Currently on vancomycin after receiving vanc/unasyn in the ED.
38 y/o male no medical history admitted to medicine for b/l foot cellulitis, worse on the right without any fever, chills, SOB. Failed outpatient augmentin therapy for 10 days. Admitted for further management of cellulitis. Currently on vancomycin after receiving vanc/unasyn in the ED.
Cellulitis of right leg - improving  Tinea Pedis(severe)    plan - can DC home today on doxycycline 100mgs po bid x 7 days and  mycostatin powder TID x 1 month
Bilateral tinea pedis w/ R foot and leg cellulitis     Plan: - cont Vancomycin 1500mgs iv q12 hrs for now  mycostatin  powder tid to feet  possible dc home tomorrow on po abxs.

## 2019-07-26 NOTE — PROGRESS NOTE ADULT - RS GEN PE MLT RESP DETAILS PC
clear to auscultation bilaterally/no rales/no rhonchi/no wheezes/good air movement
good air movement/clear to auscultation bilaterally/no rhonchi/no wheezes/no rales

## 2019-07-29 LAB
CULTURE RESULTS: SIGNIFICANT CHANGE UP
ORGANISM # SPEC MICROSCOPIC CNT: SIGNIFICANT CHANGE UP
SPECIMEN SOURCE: SIGNIFICANT CHANGE UP

## 2019-08-01 ENCOUNTER — EMERGENCY (EMERGENCY)
Facility: HOSPITAL | Age: 39
LOS: 1 days | Discharge: HOME | End: 2019-08-01
Attending: EMERGENCY MEDICINE
Payer: COMMERCIAL

## 2019-08-01 VITALS
HEART RATE: 72 BPM | TEMPERATURE: 98 F | HEIGHT: 69 IN | RESPIRATION RATE: 16 BRPM | WEIGHT: 205.03 LBS | SYSTOLIC BLOOD PRESSURE: 118 MMHG | DIASTOLIC BLOOD PRESSURE: 69 MMHG | OXYGEN SATURATION: 100 %

## 2019-08-01 PROCEDURE — 99283 EMERGENCY DEPT VISIT LOW MDM: CPT | Mod: 25

## 2019-08-01 NOTE — ED ADULT NURSE NOTE - OBJECTIVE STATEMENT
The patient present with recurrent cellulitis to his feet.  As per the patient, he was treated and discharged home from the hospital about 7 days ago.  The patient states that this episode started 2 days ago.  Redness and swelling and drainage noted on the feet.  The patient denies pain, itchiness.  The patient is also worried that the doxycycline pills might have cause an allergic reaction due to prior itchiness and skin peels from his hands and itchiness on his legs and chest a few days ago.

## 2019-08-02 ENCOUNTER — APPOINTMENT (OUTPATIENT)
Dept: ULTRASOUND IMAGING | Facility: HOSPITAL | Age: 39
End: 2019-08-02

## 2019-08-02 ENCOUNTER — OUTPATIENT (OUTPATIENT)
Dept: OUTPATIENT SERVICES | Facility: HOSPITAL | Age: 39
LOS: 1 days | End: 2019-08-02
Payer: COMMERCIAL

## 2019-08-02 DIAGNOSIS — I82.890 ACUTE EMBOLISM AND THROMBOSIS OF OTHER SPECIFIED VEINS: ICD-10-CM

## 2019-08-02 PROBLEM — Z78.9 OTHER SPECIFIED HEALTH STATUS: Chronic | Status: ACTIVE | Noted: 2019-07-23

## 2019-08-02 PROBLEM — Z00.00 ENCOUNTER FOR PREVENTIVE HEALTH EXAMINATION: Status: ACTIVE | Noted: 2019-08-02

## 2019-08-02 LAB
ALBUMIN SERPL ELPH-MCNC: 3 G/DL — LOW (ref 3.5–5)
ALP SERPL-CCNC: 108 U/L — SIGNIFICANT CHANGE UP (ref 40–120)
ALT FLD-CCNC: 18 U/L DA — SIGNIFICANT CHANGE UP (ref 10–60)
ANION GAP SERPL CALC-SCNC: 5 MMOL/L — SIGNIFICANT CHANGE UP (ref 5–17)
AST SERPL-CCNC: 18 U/L — SIGNIFICANT CHANGE UP (ref 10–40)
BASOPHILS # BLD AUTO: 0.04 K/UL — SIGNIFICANT CHANGE UP (ref 0–0.2)
BASOPHILS NFR BLD AUTO: 0.5 % — SIGNIFICANT CHANGE UP (ref 0–2)
BILIRUB SERPL-MCNC: 0.1 MG/DL — LOW (ref 0.2–1.2)
BUN SERPL-MCNC: 13 MG/DL — SIGNIFICANT CHANGE UP (ref 7–18)
CALCIUM SERPL-MCNC: 8.6 MG/DL — SIGNIFICANT CHANGE UP (ref 8.4–10.5)
CHLORIDE SERPL-SCNC: 105 MMOL/L — SIGNIFICANT CHANGE UP (ref 96–108)
CO2 SERPL-SCNC: 31 MMOL/L — SIGNIFICANT CHANGE UP (ref 22–31)
CREAT SERPL-MCNC: 1.43 MG/DL — HIGH (ref 0.5–1.3)
EOSINOPHIL # BLD AUTO: 0.82 K/UL — HIGH (ref 0–0.5)
EOSINOPHIL NFR BLD AUTO: 10.8 % — HIGH (ref 0–6)
GLUCOSE SERPL-MCNC: 98 MG/DL — SIGNIFICANT CHANGE UP (ref 70–99)
HCT VFR BLD CALC: 34.1 % — LOW (ref 39–50)
HGB BLD-MCNC: 10.8 G/DL — LOW (ref 13–17)
IMM GRANULOCYTES NFR BLD AUTO: 0.3 % — SIGNIFICANT CHANGE UP (ref 0–1.5)
LYMPHOCYTES # BLD AUTO: 2.31 K/UL — SIGNIFICANT CHANGE UP (ref 1–3.3)
LYMPHOCYTES # BLD AUTO: 30.5 % — SIGNIFICANT CHANGE UP (ref 13–44)
MCHC RBC-ENTMCNC: 24.3 PG — LOW (ref 27–34)
MCHC RBC-ENTMCNC: 31.7 GM/DL — LOW (ref 32–36)
MCV RBC AUTO: 76.6 FL — LOW (ref 80–100)
MONOCYTES # BLD AUTO: 0.66 K/UL — SIGNIFICANT CHANGE UP (ref 0–0.9)
MONOCYTES NFR BLD AUTO: 8.7 % — SIGNIFICANT CHANGE UP (ref 2–14)
NEUTROPHILS # BLD AUTO: 3.73 K/UL — SIGNIFICANT CHANGE UP (ref 1.8–7.4)
NEUTROPHILS NFR BLD AUTO: 49.2 % — SIGNIFICANT CHANGE UP (ref 43–77)
NRBC # BLD: 0 /100 WBCS — SIGNIFICANT CHANGE UP (ref 0–0)
PLATELET # BLD AUTO: 298 K/UL — SIGNIFICANT CHANGE UP (ref 150–400)
POTASSIUM SERPL-MCNC: 3.8 MMOL/L — SIGNIFICANT CHANGE UP (ref 3.5–5.3)
POTASSIUM SERPL-SCNC: 3.8 MMOL/L — SIGNIFICANT CHANGE UP (ref 3.5–5.3)
PROT SERPL-MCNC: 6.7 G/DL — SIGNIFICANT CHANGE UP (ref 6–8.3)
RBC # BLD: 4.45 M/UL — SIGNIFICANT CHANGE UP (ref 4.2–5.8)
RBC # FLD: 16.1 % — HIGH (ref 10.3–14.5)
SODIUM SERPL-SCNC: 141 MMOL/L — SIGNIFICANT CHANGE UP (ref 135–145)
WBC # BLD: 7.58 K/UL — SIGNIFICANT CHANGE UP (ref 3.8–10.5)
WBC # FLD AUTO: 7.58 K/UL — SIGNIFICANT CHANGE UP (ref 3.8–10.5)

## 2019-08-02 PROCEDURE — 36415 COLL VENOUS BLD VENIPUNCTURE: CPT

## 2019-08-02 PROCEDURE — 99283 EMERGENCY DEPT VISIT LOW MDM: CPT

## 2019-08-02 PROCEDURE — 93970 EXTREMITY STUDY: CPT | Mod: 26

## 2019-08-02 PROCEDURE — 87040 BLOOD CULTURE FOR BACTERIA: CPT

## 2019-08-02 PROCEDURE — 93970 EXTREMITY STUDY: CPT

## 2019-08-02 PROCEDURE — 80053 COMPREHEN METABOLIC PANEL: CPT

## 2019-08-02 PROCEDURE — 85027 COMPLETE CBC AUTOMATED: CPT

## 2019-08-02 NOTE — ED PROVIDER NOTE - CLINICAL SUMMARY MEDICAL DECISION MAKING FREE TEXT BOX
39 year old male with RLE swelling. vitals WNL.PE as above.  labs, reassess 39 year old male with RLE swelling. vitals WNL.PE as above.  labs, reassess  labs are unremarkable. will give rx for US to r/o DVT. advised continue abx. compression bandages to RLE and elevation. f/u with PMD tomorrow and US tomorrow. return precautions given.

## 2019-08-02 NOTE — ED PROVIDER NOTE - OBJECTIVE STATEMENT
39 year old male denies PMH coming in with RLE swelling. pt was recently discharged from hospital for b/l foot cellulitis on doxycycline which he has been taking. states that since discharge swelling had gone down, but over the past 2 days RLE started to have swelling again. states has been elevated RLE but hasn't improved swelling. denies pain. states has f/u tomorrow with his PMD for follow up since dc from hospital. Denies fevers, chills, sweats. states that discharge from b/l feet has improved. no hx dvt or PE, states ambulatory.

## 2019-08-02 NOTE — ED PROVIDER NOTE - PHYSICAL EXAMINATION
RLE swollen compared to left.  b/l LE N/V intact.  right foot with scant clear discharge and crusting skin.  ambulatory in ed.  no warmth or erythema of b/l LE

## 2019-08-07 LAB
CULTURE RESULTS: SIGNIFICANT CHANGE UP
CULTURE RESULTS: SIGNIFICANT CHANGE UP
SPECIMEN SOURCE: SIGNIFICANT CHANGE UP
SPECIMEN SOURCE: SIGNIFICANT CHANGE UP

## 2021-10-29 ENCOUNTER — APPOINTMENT (OUTPATIENT)
Dept: UROLOGY | Facility: CLINIC | Age: 41
End: 2021-10-29

## 2021-11-15 ENCOUNTER — APPOINTMENT (OUTPATIENT)
Age: 41
End: 2021-11-15

## 2021-12-01 ENCOUNTER — APPOINTMENT (OUTPATIENT)
Age: 41
End: 2021-12-01

## 2023-01-10 NOTE — PROGRESS NOTE ADULT - PROBLEM SELECTOR PLAN 4
How Severe Are Your Spot(S)?: mild
Hpi Title: Evaluation of Skin Lesions
Year Removed: 1900
-18 ng/mL  -50k U weekly for 6-8 weeks
IMPROVE VTE Individual Risk Assessment    RISK                                                                Points    [  ] Previous VTE                                                  3  [  ] Thrombophilia                                               2  [  ] Lower limb paralysis                                      2        (unable to hold up >15 seconds)    [  ] Current Cancer                                              2         (within 6 months)  [X  ] Immobilization > 24 hrs                                1  [  ] ICU/CCU stay > 24 hours                              1  [  ] Age > 60                                                      1    IMPROVE VTE Score ____1_____    Lovenox 40mg subq for dvt ppx

## 2023-06-07 ENCOUNTER — TRANSCRIPTION ENCOUNTER (OUTPATIENT)
Age: 43
End: 2023-06-07

## 2023-08-15 NOTE — DISCHARGE NOTE NURSING/CASE MANAGEMENT/SOCIAL WORK - NSDCDPATPORTLINK_GEN_ALL_CORE
You can access the [facility], offered by [facility], by registering with the following website: [email or web address]

## 2023-10-04 NOTE — H&P ADULT - PROBLEM SELECTOR PROBLEM 1
10/04/23         Frank Mccollum  [address]  [place] [address]        This letter is to remind you that you are due for a :    [] Physical/Annual Exam        [] Mammogram    [] Colonoscopy    [] CT/EKG/Radiology Procedure    [x] Follow up Appointment    [] Pap Smear    [] Nurse Blood Pressure Check    [] Non-fasting labs      [] Fasting labs  - Please fast 10 to 12 hours prior to your blood work. You may drink water during the fasting period.    [] Urine sample    Please call to schedule a lab appointment..  If these tests have already been done. Please call to update your records.    Please call Dr. Mannie Cage's office at [phone] between 8:00 am and 5:00 pm Monday-Friday and speak to the . Please have this letter with you when you call.    We are unable to guarantee insurance/Medicare coverage for services provided. Please contact your insurance company with questions regarding coverage.    Thank you,  [address] [place] [address] [phone]  
Cellulitis of foot

## 2024-06-03 NOTE — PROGRESS NOTE ADULT - PROBLEM SELECTOR PLAN 1
p/w serous and foul-smelling drainage from right foot and pain w/ symptoms on left foot as well  s/p 10 days of Augmentin outpatient with no improvement  s/p vanc and unasyn x 1 in ED  On vancomycin 1500mg q12  f/u xray of right foot- obtained, pending official reading  f/u blood cultures x2  ESR, CRP elevated  HbA1C- pending  podiatry consulted  ID consulted - Dr. Nunez p/w serous and foul-smelling drainage from right foot and pain w/ symptoms on left foot as well  s/p 10 days of Augmentin outpatient with no improvement  s/p vanc and unasyn x 1 in ED  On vancomycin 1500mg q12  vanc trough tomorrow AM  xray- no fractures or destructive change  f/u blood cultures x2  ESR, CRP elevated  HbA1C- 5.8  podiatry consulted  ID consulted - Dr. Nunez [place]

## 2024-07-26 NOTE — ED ADULT TRIAGE NOTE - [facility] TRG CHIEF CMPLNT
-Secondary to CHF, improved with IV diureiss    c/o right lower leg swelling x 2 days/see chief complaint quote
